# Patient Record
Sex: FEMALE | Race: WHITE | Employment: OTHER | URBAN - METROPOLITAN AREA
[De-identification: names, ages, dates, MRNs, and addresses within clinical notes are randomized per-mention and may not be internally consistent; named-entity substitution may affect disease eponyms.]

---

## 2017-01-18 RX ORDER — OMEPRAZOLE 20 MG/1
CAPSULE, DELAYED RELEASE ORAL
Qty: 30 CAPSULE | Refills: 2 | Status: SHIPPED | OUTPATIENT
Start: 2017-01-18 | End: 2017-04-18 | Stop reason: SDUPTHER

## 2017-01-18 RX ORDER — POTASSIUM CHLORIDE 750 MG/1
TABLET, FILM COATED, EXTENDED RELEASE ORAL
Qty: 180 TABLET | Refills: 0 | Status: SHIPPED | OUTPATIENT
Start: 2017-01-18 | End: 2017-04-18 | Stop reason: SDUPTHER

## 2017-02-21 RX ORDER — TRIAMTERENE AND HYDROCHLOROTHIAZIDE 37.5; 25 MG/1; MG/1
TABLET ORAL
Qty: 90 TABLET | Refills: 0 | Status: SHIPPED | OUTPATIENT
Start: 2017-02-21 | End: 2017-03-28 | Stop reason: SDUPTHER

## 2017-03-28 RX ORDER — SULINDAC 200 MG/1
TABLET ORAL
Qty: 180 TABLET | Refills: 0 | Status: SHIPPED | OUTPATIENT
Start: 2017-03-28 | End: 2017-06-21 | Stop reason: SDUPTHER

## 2017-03-28 RX ORDER — TRIAMTERENE AND HYDROCHLOROTHIAZIDE 37.5; 25 MG/1; MG/1
TABLET ORAL
Qty: 90 TABLET | Refills: 0 | Status: SHIPPED | OUTPATIENT
Start: 2017-03-28 | End: 2017-07-25 | Stop reason: SDUPTHER

## 2017-04-13 ENCOUNTER — HOSPITAL ENCOUNTER (OUTPATIENT)
Dept: OTHER | Age: 57
Discharge: HOME | End: 2017-04-13
Attending: INTERNAL MEDICINE | Admitting: INTERNAL MEDICINE

## 2017-04-13 ENCOUNTER — OFFICE VISIT (OUTPATIENT)
Dept: INTERNAL MEDICINE CLINIC | Age: 57
End: 2017-04-13

## 2017-04-13 VITALS
BODY MASS INDEX: 24.92 KG/M2 | SYSTOLIC BLOOD PRESSURE: 130 MMHG | DIASTOLIC BLOOD PRESSURE: 75 MMHG | WEIGHT: 146 LBS | HEIGHT: 64 IN | HEART RATE: 70 BPM | RESPIRATION RATE: 18 BRPM

## 2017-04-13 DIAGNOSIS — I10 BENIGN ESSENTIAL HTN: Primary | ICD-10-CM

## 2017-04-13 DIAGNOSIS — G35 MULTIPLE SCLEROSIS (HCC): ICD-10-CM

## 2017-04-13 DIAGNOSIS — G89.29 CHRONIC LEFT SHOULDER PAIN: ICD-10-CM

## 2017-04-13 DIAGNOSIS — M25.512 CHRONIC LEFT SHOULDER PAIN: ICD-10-CM

## 2017-04-13 DIAGNOSIS — G62.9 PERIPHERAL POLYNEUROPATHY: ICD-10-CM

## 2017-04-13 DIAGNOSIS — I10 BENIGN ESSENTIAL HTN: ICD-10-CM

## 2017-04-13 LAB
ANION GAP SERPL CALCULATED.3IONS-SCNC: 16 MMOL/L (ref 3–16)
BUN BLDV-MCNC: 19 MG/DL (ref 7–20)
CALCIUM SERPL-MCNC: 9.5 MG/DL (ref 8.3–10.6)
CHLORIDE BLD-SCNC: 103 MMOL/L (ref 99–110)
CO2: 23 MMOL/L (ref 21–32)
CREAT SERPL-MCNC: 0.7 MG/DL (ref 0.6–1.1)
GFR AFRICAN AMERICAN: >60
GFR NON-AFRICAN AMERICAN: >60
GLUCOSE BLD-MCNC: 107 MG/DL (ref 70–99)
POTASSIUM SERPL-SCNC: 4.1 MMOL/L (ref 3.5–5.1)
SODIUM BLD-SCNC: 142 MMOL/L (ref 136–145)

## 2017-04-13 PROCEDURE — 99214 OFFICE O/P EST MOD 30 MIN: CPT | Performed by: INTERNAL MEDICINE

## 2017-04-13 RX ORDER — CYCLOBENZAPRINE HCL 5 MG
5 TABLET ORAL 2 TIMES DAILY PRN
Qty: 20 TABLET | Refills: 0 | Status: SHIPPED | OUTPATIENT
Start: 2017-04-13 | End: 2017-04-21 | Stop reason: SDUPTHER

## 2017-04-13 ASSESSMENT — ENCOUNTER SYMPTOMS
ABDOMINAL PAIN: 1
NAUSEA: 0
CHEST TIGHTNESS: 0
PHOTOPHOBIA: 0
COLOR CHANGE: 0
SHORTNESS OF BREATH: 0
CONSTIPATION: 0
TROUBLE SWALLOWING: 0

## 2017-04-18 ENCOUNTER — TELEPHONE (OUTPATIENT)
Dept: INTERNAL MEDICINE CLINIC | Age: 57
End: 2017-04-18

## 2017-04-18 DIAGNOSIS — G89.29 CHRONIC LEFT SHOULDER PAIN: Primary | ICD-10-CM

## 2017-04-18 DIAGNOSIS — M25.512 CHRONIC LEFT SHOULDER PAIN: Primary | ICD-10-CM

## 2017-04-18 RX ORDER — POTASSIUM CHLORIDE 750 MG/1
TABLET, FILM COATED, EXTENDED RELEASE ORAL
Qty: 180 TABLET | Refills: 1 | Status: SHIPPED | OUTPATIENT
Start: 2017-04-18 | End: 2017-10-28 | Stop reason: SDUPTHER

## 2017-04-18 RX ORDER — OMEPRAZOLE 20 MG/1
CAPSULE, DELAYED RELEASE ORAL
Qty: 90 CAPSULE | Refills: 1 | Status: SHIPPED | OUTPATIENT
Start: 2017-04-18 | End: 2017-10-28 | Stop reason: SDUPTHER

## 2017-04-18 RX ORDER — MONTELUKAST SODIUM 10 MG/1
TABLET ORAL
Qty: 90 TABLET | Refills: 1 | Status: SHIPPED | OUTPATIENT
Start: 2017-04-18 | End: 2017-10-28 | Stop reason: SDUPTHER

## 2017-04-20 ENCOUNTER — HOSPITAL ENCOUNTER (OUTPATIENT)
Dept: OTHER | Age: 57
Discharge: HOME | End: 2017-04-20
Attending: PSYCHIATRY & NEUROLOGY | Admitting: PSYCHIATRY & NEUROLOGY

## 2017-04-20 LAB
CREAT SERPL-MCNC: 0.7 MG/DL (ref 0.6–1.1)
GFR AFRICAN AMERICAN: >60
GFR NON-AFRICAN AMERICAN: >60

## 2017-04-21 ENCOUNTER — TELEPHONE (OUTPATIENT)
Dept: INTERNAL MEDICINE CLINIC | Age: 57
End: 2017-04-21

## 2017-04-21 RX ORDER — CYCLOBENZAPRINE HCL 5 MG
5 TABLET ORAL 2 TIMES DAILY PRN
Qty: 20 TABLET | Refills: 0 | Status: SHIPPED | OUTPATIENT
Start: 2017-04-21 | End: 2017-04-28

## 2017-05-02 ENCOUNTER — TELEPHONE (OUTPATIENT)
Dept: INTERNAL MEDICINE CLINIC | Age: 57
End: 2017-05-02

## 2017-05-02 RX ORDER — CYCLOBENZAPRINE HCL 5 MG
5 TABLET ORAL 2 TIMES DAILY PRN
Qty: 60 TABLET | Refills: 0 | Status: SHIPPED | OUTPATIENT
Start: 2017-05-02 | End: 2017-06-22 | Stop reason: SDUPTHER

## 2017-05-03 ENCOUNTER — TELEPHONE (OUTPATIENT)
Dept: INTERNAL MEDICINE CLINIC | Age: 57
End: 2017-05-03

## 2017-05-22 RX ORDER — LOSARTAN POTASSIUM 50 MG/1
TABLET ORAL
Qty: 90 TABLET | Refills: 0 | Status: SHIPPED | OUTPATIENT
Start: 2017-05-22 | End: 2017-08-17 | Stop reason: SDUPTHER

## 2017-05-23 ENCOUNTER — OFFICE VISIT (OUTPATIENT)
Dept: ORTHOPEDIC SURGERY | Age: 57
End: 2017-05-23

## 2017-05-23 VITALS — WEIGHT: 145.94 LBS | HEIGHT: 64 IN | BODY MASS INDEX: 24.92 KG/M2

## 2017-05-23 DIAGNOSIS — M54.12 CERVICAL RADICULOPATHY: ICD-10-CM

## 2017-05-23 DIAGNOSIS — M50.30 DDD (DEGENERATIVE DISC DISEASE), CERVICAL: ICD-10-CM

## 2017-05-23 DIAGNOSIS — M25.512 LEFT SHOULDER PAIN, UNSPECIFIED CHRONICITY: Primary | ICD-10-CM

## 2017-05-23 DIAGNOSIS — M75.02 ADHESIVE CAPSULITIS OF LEFT SHOULDER: ICD-10-CM

## 2017-05-23 DIAGNOSIS — M75.42 SHOULDER IMPINGEMENT, LEFT: ICD-10-CM

## 2017-05-23 PROBLEM — M25.819 SHOULDER IMPINGEMENT: Status: ACTIVE | Noted: 2017-05-23

## 2017-05-23 PROBLEM — M75.40 SHOULDER IMPINGEMENT: Status: ACTIVE | Noted: 2017-05-23

## 2017-05-23 PROCEDURE — 72040 X-RAY EXAM NECK SPINE 2-3 VW: CPT | Performed by: PHYSICIAN ASSISTANT

## 2017-05-23 PROCEDURE — 99203 OFFICE O/P NEW LOW 30 MIN: CPT | Performed by: PHYSICIAN ASSISTANT

## 2017-05-23 PROCEDURE — 73030 X-RAY EXAM OF SHOULDER: CPT | Performed by: PHYSICIAN ASSISTANT

## 2017-05-23 RX ORDER — MELOXICAM 15 MG/1
15 TABLET ORAL DAILY
Qty: 30 TABLET | Refills: 3 | Status: SHIPPED | OUTPATIENT
Start: 2017-05-23 | End: 2017-09-21 | Stop reason: SDUPTHER

## 2017-05-26 RX ORDER — CYCLOBENZAPRINE HCL 5 MG
5 TABLET ORAL 2 TIMES DAILY PRN
Qty: 60 TABLET | Refills: 0 | OUTPATIENT
Start: 2017-05-26

## 2017-05-26 RX ORDER — CYCLOBENZAPRINE HCL 5 MG
TABLET ORAL
Qty: 20 TABLET | Refills: 0 | Status: SHIPPED | OUTPATIENT
Start: 2017-05-26 | End: 2017-06-22 | Stop reason: SDUPTHER

## 2017-05-30 ENCOUNTER — TELEPHONE (OUTPATIENT)
Dept: INTERNAL MEDICINE CLINIC | Age: 57
End: 2017-05-30

## 2017-05-30 RX ORDER — FUROSEMIDE 20 MG/1
20 TABLET ORAL DAILY
Qty: 3 TABLET | Refills: 0 | Status: SHIPPED | OUTPATIENT
Start: 2017-05-30 | End: 2017-11-07 | Stop reason: ALTCHOICE

## 2017-06-22 ENCOUNTER — OFFICE VISIT (OUTPATIENT)
Dept: ORTHOPEDIC SURGERY | Age: 57
End: 2017-06-22

## 2017-06-22 VITALS
DIASTOLIC BLOOD PRESSURE: 70 MMHG | HEART RATE: 74 BPM | WEIGHT: 145.94 LBS | BODY MASS INDEX: 24.92 KG/M2 | HEIGHT: 64 IN | SYSTOLIC BLOOD PRESSURE: 135 MMHG

## 2017-06-22 DIAGNOSIS — M75.42 SHOULDER IMPINGEMENT, LEFT: Primary | ICD-10-CM

## 2017-06-22 DIAGNOSIS — M75.02 ADHESIVE CAPSULITIS OF LEFT SHOULDER: ICD-10-CM

## 2017-06-22 PROCEDURE — 99213 OFFICE O/P EST LOW 20 MIN: CPT | Performed by: ORTHOPAEDIC SURGERY

## 2017-06-22 RX ORDER — CYCLOBENZAPRINE HCL 5 MG
5 TABLET ORAL 2 TIMES DAILY PRN
Qty: 20 TABLET | Refills: 0 | Status: SHIPPED | OUTPATIENT
Start: 2017-06-22 | End: 2017-08-17 | Stop reason: SDUPTHER

## 2017-06-22 RX ORDER — CYCLOBENZAPRINE HCL 5 MG
5 TABLET ORAL 2 TIMES DAILY PRN
Qty: 60 TABLET | Refills: 0 | Status: SHIPPED | OUTPATIENT
Start: 2017-06-22 | End: 2017-06-22 | Stop reason: SDUPTHER

## 2017-06-23 RX ORDER — SULINDAC 200 MG/1
TABLET ORAL
Qty: 180 TABLET | Refills: 0 | Status: SHIPPED | OUTPATIENT
Start: 2017-06-23 | End: 2017-09-22 | Stop reason: SDUPTHER

## 2017-07-26 RX ORDER — TRIAMTERENE AND HYDROCHLOROTHIAZIDE 37.5; 25 MG/1; MG/1
TABLET ORAL
Qty: 90 TABLET | Refills: 0 | Status: SHIPPED | OUTPATIENT
Start: 2017-07-26 | End: 2017-09-21 | Stop reason: SDUPTHER

## 2017-08-15 ENCOUNTER — OFFICE VISIT (OUTPATIENT)
Dept: INTERNAL MEDICINE CLINIC | Age: 57
End: 2017-08-15

## 2017-08-15 ENCOUNTER — TELEPHONE (OUTPATIENT)
Dept: INTERNAL MEDICINE CLINIC | Age: 57
End: 2017-08-15

## 2017-08-15 VITALS — SYSTOLIC BLOOD PRESSURE: 130 MMHG | HEART RATE: 70 BPM | RESPIRATION RATE: 18 BRPM | DIASTOLIC BLOOD PRESSURE: 75 MMHG

## 2017-08-15 DIAGNOSIS — M54.41 ACUTE MIDLINE LOW BACK PAIN WITH RIGHT-SIDED SCIATICA: Primary | ICD-10-CM

## 2017-08-15 DIAGNOSIS — M51.36 DDD (DEGENERATIVE DISC DISEASE), LUMBAR: ICD-10-CM

## 2017-08-15 DIAGNOSIS — M54.9 BACK PAIN, UNSPECIFIED BACK LOCATION, UNSPECIFIED BACK PAIN LATERALITY, UNSPECIFIED CHRONICITY: Primary | ICD-10-CM

## 2017-08-15 PROCEDURE — 99213 OFFICE O/P EST LOW 20 MIN: CPT | Performed by: INTERNAL MEDICINE

## 2017-08-15 RX ORDER — PREDNISONE 10 MG/1
TABLET ORAL
Qty: 10 TABLET | Refills: 0 | Status: SHIPPED | OUTPATIENT
Start: 2017-08-15 | End: 2017-11-07 | Stop reason: ALTCHOICE

## 2017-08-15 ASSESSMENT — ENCOUNTER SYMPTOMS
COLOR CHANGE: 0
BACK PAIN: 1
TROUBLE SWALLOWING: 0
NAUSEA: 0
CONSTIPATION: 0
CHEST TIGHTNESS: 0
PHOTOPHOBIA: 0
SHORTNESS OF BREATH: 0

## 2017-08-17 RX ORDER — CYCLOBENZAPRINE HCL 5 MG
TABLET ORAL
Qty: 20 TABLET | Refills: 0 | Status: SHIPPED | OUTPATIENT
Start: 2017-08-17 | End: 2017-09-21 | Stop reason: SDUPTHER

## 2017-08-17 RX ORDER — LOSARTAN POTASSIUM 50 MG/1
TABLET ORAL
Qty: 90 TABLET | Refills: 0 | Status: SHIPPED | OUTPATIENT
Start: 2017-08-17 | End: 2017-11-27 | Stop reason: SDUPTHER

## 2017-08-18 RX ORDER — FLUOCINONIDE 0.5 MG/G
OINTMENT TOPICAL
Qty: 60 G | Refills: 0 | Status: SHIPPED | OUTPATIENT
Start: 2017-08-18 | End: 2017-09-21 | Stop reason: SDUPTHER

## 2017-09-22 RX ORDER — CYCLOBENZAPRINE HCL 5 MG
TABLET ORAL
Qty: 20 TABLET | Refills: 0 | Status: SHIPPED | OUTPATIENT
Start: 2017-09-22 | End: 2017-10-28 | Stop reason: SDUPTHER

## 2017-09-22 RX ORDER — MELOXICAM 15 MG/1
TABLET ORAL
Qty: 30 TABLET | Refills: 3 | Status: SHIPPED | OUTPATIENT
Start: 2017-09-22 | End: 2017-11-07 | Stop reason: ALTCHOICE

## 2017-09-22 RX ORDER — SULINDAC 200 MG/1
TABLET ORAL
Qty: 180 TABLET | Refills: 0 | OUTPATIENT
Start: 2017-09-22

## 2017-09-22 RX ORDER — FLUOCINONIDE 0.5 MG/G
OINTMENT TOPICAL
Qty: 60 G | Refills: 0 | Status: SHIPPED | OUTPATIENT
Start: 2017-09-22 | End: 2017-11-06 | Stop reason: SDUPTHER

## 2017-09-22 RX ORDER — TRIAMTERENE AND HYDROCHLOROTHIAZIDE 37.5; 25 MG/1; MG/1
TABLET ORAL
Qty: 90 TABLET | Refills: 0 | Status: SHIPPED | OUTPATIENT
Start: 2017-09-22 | End: 2017-12-22 | Stop reason: SDUPTHER

## 2017-10-30 RX ORDER — POTASSIUM CHLORIDE 750 MG/1
TABLET, FILM COATED, EXTENDED RELEASE ORAL
Qty: 180 TABLET | Refills: 1 | Status: SHIPPED | OUTPATIENT
Start: 2017-10-30 | End: 2018-04-11 | Stop reason: SDUPTHER

## 2017-10-30 RX ORDER — OMEPRAZOLE 20 MG/1
CAPSULE, DELAYED RELEASE ORAL
Qty: 30 CAPSULE | Refills: 5 | Status: SHIPPED | OUTPATIENT
Start: 2017-10-30 | End: 2018-04-11 | Stop reason: SDUPTHER

## 2017-10-30 RX ORDER — MONTELUKAST SODIUM 10 MG/1
TABLET ORAL
Qty: 90 TABLET | Refills: 1 | Status: SHIPPED | OUTPATIENT
Start: 2017-10-30 | End: 2018-04-11 | Stop reason: SDUPTHER

## 2017-10-30 RX ORDER — CYCLOBENZAPRINE HCL 5 MG
TABLET ORAL
Qty: 20 TABLET | Refills: 0 | Status: SHIPPED | OUTPATIENT
Start: 2017-10-30 | End: 2017-11-27 | Stop reason: SDUPTHER

## 2017-11-06 RX ORDER — FLUOCINONIDE 0.5 MG/G
OINTMENT TOPICAL
Qty: 60 G | Refills: 0 | Status: SHIPPED | OUTPATIENT
Start: 2017-11-06 | End: 2017-11-27 | Stop reason: SDUPTHER

## 2017-11-07 ENCOUNTER — OFFICE VISIT (OUTPATIENT)
Dept: INTERNAL MEDICINE CLINIC | Age: 57
End: 2017-11-07

## 2017-11-07 VITALS
WEIGHT: 155 LBS | HEART RATE: 70 BPM | SYSTOLIC BLOOD PRESSURE: 150 MMHG | DIASTOLIC BLOOD PRESSURE: 75 MMHG | RESPIRATION RATE: 18 BRPM | BODY MASS INDEX: 26.46 KG/M2

## 2017-11-07 DIAGNOSIS — R60.0 PEDAL EDEMA: Primary | ICD-10-CM

## 2017-11-07 DIAGNOSIS — Z79.1 NSAID LONG-TERM USE: ICD-10-CM

## 2017-11-07 DIAGNOSIS — I10 BENIGN ESSENTIAL HTN: ICD-10-CM

## 2017-11-07 DIAGNOSIS — R60.0 PEDAL EDEMA: ICD-10-CM

## 2017-11-07 PROBLEM — N95.1 MENOPAUSAL STATE: Status: ACTIVE | Noted: 2017-10-12

## 2017-11-07 LAB
ANION GAP SERPL CALCULATED.3IONS-SCNC: 13 MMOL/L (ref 3–16)
BILIRUBIN, POC: NORMAL
BLOOD URINE, POC: NORMAL
BUN BLDV-MCNC: 15 MG/DL (ref 7–20)
CALCIUM SERPL-MCNC: 9.5 MG/DL (ref 8.3–10.6)
CHLORIDE BLD-SCNC: 106 MMOL/L (ref 99–110)
CLARITY, POC: NORMAL
CO2: 23 MMOL/L (ref 21–32)
COLOR, POC: NORMAL
CREAT SERPL-MCNC: 0.7 MG/DL (ref 0.6–1.1)
GFR AFRICAN AMERICAN: >60
GFR NON-AFRICAN AMERICAN: >60
GLUCOSE BLD-MCNC: 81 MG/DL (ref 70–99)
GLUCOSE URINE, POC: NORMAL
KETONES, POC: NORMAL
LEUKOCYTE EST, POC: NORMAL
NITRITE, POC: NORMAL
PH, POC: NORMAL
POTASSIUM SERPL-SCNC: 4.1 MMOL/L (ref 3.5–5.1)
PROTEIN PROTEIN: 9 MG/DL
PROTEIN, POC: NORMAL
SODIUM BLD-SCNC: 142 MMOL/L (ref 136–145)
SPECIFIC GRAVITY, POC: NORMAL
UROBILINOGEN, POC: NORMAL

## 2017-11-07 PROCEDURE — 99213 OFFICE O/P EST LOW 20 MIN: CPT | Performed by: INTERNAL MEDICINE

## 2017-11-07 PROCEDURE — 81002 URINALYSIS NONAUTO W/O SCOPE: CPT | Performed by: INTERNAL MEDICINE

## 2017-11-07 RX ORDER — FUROSEMIDE 20 MG/1
20 TABLET ORAL DAILY
Qty: 10 TABLET | Refills: 0 | Status: SHIPPED | OUTPATIENT
Start: 2017-11-07 | End: 2018-01-02 | Stop reason: SDUPTHER

## 2017-11-07 ASSESSMENT — ENCOUNTER SYMPTOMS
COLOR CHANGE: 0
TROUBLE SWALLOWING: 0
SHORTNESS OF BREATH: 0
NAUSEA: 0
CHEST TIGHTNESS: 0
CONSTIPATION: 0
PHOTOPHOBIA: 0

## 2017-11-07 NOTE — PROGRESS NOTES
Subjective:      Patient ID: Ish Daly is a 56 y.o. female. HPI      56 y.o. female with h.o HTN,, MS on copaxone,   varicose veins, chronic pedal edema here for regularf.w    Since last visit pt reports more increased bilateral leg swelling and no sob or cough  Reports taking sulindac and prn mobic, no changes in diet or activity  No excessive salt intake  Previously used prn lasix and now does not have one  Using maxzide with no benefit    No h.o renal disease, no change in urine color or nature        Current Outpatient Prescriptions   Medication Sig Dispense Refill    furosemide (LASIX) 20 MG tablet Take 1 tablet by mouth daily 10 tablet 0    fluocinonide (LIDEX) 0.05 % ointment APPLY  OINTMENT TOPICALLY TWICE DAILY 60 g 0    omeprazole (PRILOSEC) 20 MG delayed release capsule TAKE ONE CAPSULE BY MOUTH ONCE DAILY 30 capsule 5    cyclobenzaprine (FLEXERIL) 5 MG tablet TAKE ONE TABLET BY MOUTH TWICE DAILY AS NEEDED FOR  MUSCLE  SPASMS 20 tablet 0    montelukast (SINGULAIR) 10 MG tablet TAKE ONE TABLET BY MOUTH IN THE EVENING 90 tablet 1    potassium chloride (KLOR-CON) 10 MEQ extended release tablet TAKE ONE TABLET BY MOUTH TWICE DAILY 180 tablet 1    triamterene-hydrochlorothiazide (MAXZIDE-25) 37.5-25 MG per tablet TAKE ONE TABLET BY MOUTH ONCE DAILY 90 tablet 0    losartan (COZAAR) 50 MG tablet TAKE ONE TABLET BY MOUTH ONCE DAILY 90 tablet 0    gabapentin (NEURONTIN) 400 MG capsule Take 1 capsule by mouth 2 times daily 180 capsule 1    EPINEPHrine (EPIPEN 2-CONSTANZA) 0.3 MG/0.3ML SOAJ injection Use as directed for allergic reaction 1 each 2    Glatiramer Acetate (COPAXONE SC) Inject  into the skin. No current facility-administered medications for this visit. Review of Systems   Constitutional: Negative for activity change, diaphoresis and unexpected weight change. HENT: Negative for congestion, ear discharge, hearing loss and trouble swallowing.     Eyes: Negative for photophobia and visual disturbance. Respiratory: Negative for chest tightness and shortness of breath. Cardiovascular: Positive for leg swelling. Negative for chest pain and palpitations. Gastrointestinal: Negative for constipation and nausea. Endocrine: Negative for cold intolerance, heat intolerance and polyuria. Genitourinary: Negative for dysuria, flank pain and hematuria. Musculoskeletal: Positive for arthralgias. Negative for gait problem. Left shoulder pain    Skin: Negative for color change. Allergic/Immunologic: Negative for immunocompromised state. Neurological: Positive for weakness. Negative for tremors, seizures and numbness. Psychiatric/Behavioral: Negative for decreased concentration. The patient is not nervous/anxious. As above    There are no changes to past medical history, family history, social history or review of systems(except as noted in the history section) since prior note (all reviewed with patient). Objective:   Physical Exam  Vitals:    11/07/17 1439   BP: (!) 150/75   Pulse: 70   Resp: 18         General:  Awake, alert and oriented. Appears to be not in any distress  Mucous Membranes:  Pink , anicteric  Neck: No JVD, no carotid bruit, no thyromegaly  Chest:  Clear to auscultation bilaterally, no added sounds  Cardiovascular:  RRR S1S2 heard, no murmurs or gallops  Abdomen:  Soft, undistended, non tender, no organomegaly, BS present  Extremities: limited left shoulder movements with pain   Anterior rotator cuff pain noted   No edema or cyanosis. Distal pulses well felt  Neurological : grossly normal except for left LE weakness       Assessment:      1. Pedal edema  BASIC METABOLIC PANEL    POCT urinalysis dipstick    PROTEIN, URINE, RANDOM   2. Benign essential HTN  BASIC METABOLIC PANEL    PROTEIN, URINE, RANDOM   3.  NSAID long-term use             Plan:      Bilateral Pedal edema - most likely nsaid use  Already on sulindac and using mobic prn  No signs of CHF  No recent dietary changes  Check UA and urine protein    Add lasix 20 mg daily x 7 days    HTN - BP slightly high today most likely fluid and salt retention   - continue losartan     Mare Hanson was evaluated today and a DME order was entered for a standard wheelchair because she requires this to successfully complete daily living tasks of toileting, personal cares and ambulating. A standard manual wheelchair is necessary due to patient's impaired ambulation and mobility restrictions and would be unable to resolve these daily living tasks using a cane or walker. The patient is capable of using a standard wheelchair safely in their home and can maneuver within their home with adequate access. There is a caregiver available to provide necessary assistance. The need for this equipment was discussed with the patient and she understands, is in agreement, and has not expressed an unwillingness to use the wheelchair.

## 2017-11-27 RX ORDER — FLUOCINONIDE 0.5 MG/G
OINTMENT TOPICAL
Qty: 60 G | Refills: 3 | Status: SHIPPED | OUTPATIENT
Start: 2017-11-27 | End: 2018-05-07 | Stop reason: ALTCHOICE

## 2017-11-27 RX ORDER — CYCLOBENZAPRINE HCL 5 MG
TABLET ORAL
Qty: 20 TABLET | Refills: 3 | Status: SHIPPED | OUTPATIENT
Start: 2017-11-27 | End: 2018-05-07 | Stop reason: ALTCHOICE

## 2017-11-27 RX ORDER — LOSARTAN POTASSIUM 50 MG/1
TABLET ORAL
Qty: 90 TABLET | Refills: 0 | Status: SHIPPED | OUTPATIENT
Start: 2017-11-27 | End: 2018-02-21 | Stop reason: SDUPTHER

## 2017-12-26 RX ORDER — TRIAMTERENE AND HYDROCHLOROTHIAZIDE 37.5; 25 MG/1; MG/1
TABLET ORAL
Qty: 90 TABLET | Refills: 0 | Status: SHIPPED | OUTPATIENT
Start: 2017-12-26 | End: 2018-01-22 | Stop reason: SDUPTHER

## 2018-01-02 RX ORDER — FUROSEMIDE 20 MG/1
20 TABLET ORAL DAILY
Qty: 10 TABLET | Refills: 0 | Status: SHIPPED | OUTPATIENT
Start: 2018-01-02 | End: 2018-02-16 | Stop reason: SDUPTHER

## 2018-01-02 RX ORDER — SULINDAC 200 MG/1
TABLET ORAL
Qty: 180 TABLET | Refills: 0 | Status: SHIPPED | OUTPATIENT
Start: 2018-01-02 | End: 2018-02-12 | Stop reason: ALTCHOICE

## 2018-01-02 NOTE — TELEPHONE ENCOUNTER
----- Message from Bessie Sevilla MD sent at 1/2/2018  1:27 PM EST -----  Contact: XD:[phone]      ----- Message -----  From: Rita Mathew  Sent: 1/2/2018   1:14 PM  To: Bessie Sevilla MD    Needs her furosemide (LASIX) 20 MG tablet called in to [facility].     Last Visit: 11/07/17  Future Visit: 02/12/18    Verner Bride

## 2018-01-11 ENCOUNTER — OFFICE VISIT (OUTPATIENT)
Dept: ORTHOPEDIC SURGERY | Age: 58
End: 2018-01-11

## 2018-01-11 VITALS — WEIGHT: 142 LBS | BODY MASS INDEX: 25.16 KG/M2 | HEIGHT: 63 IN

## 2018-01-11 DIAGNOSIS — R52 PAIN: Primary | ICD-10-CM

## 2018-01-11 DIAGNOSIS — G35 MULTIPLE SCLEROSIS (HCC): ICD-10-CM

## 2018-01-11 DIAGNOSIS — S32.592A PUBIC RAMUS FRACTURE, LEFT, CLOSED, INITIAL ENCOUNTER (HCC): ICD-10-CM

## 2018-01-11 PROCEDURE — 99213 OFFICE O/P EST LOW 20 MIN: CPT | Performed by: ORTHOPAEDIC SURGERY

## 2018-01-11 NOTE — LETTER
[facility]  [address] [facility] [address]  Phone: [phone]  Fax: [phone]    Jyotsna Orourke MD        2018        PT:  Taylor Murillo   1960  DX:  Left superior/inferior pubic ramus fracture (S32.592A0; Multiple Sclerosis (G35)    Please fit patient for Duet Walker/Transport Chair.           Sincerely,    2018 11:59 AM    Jyotsna Orourke MD

## 2018-01-11 NOTE — PROGRESS NOTES
has good coordination. There is no weakness or sensory deficit. Body mass index is 25.15 kg/m². Right Hip Examination:    Inspection:  No erythema or signs of infection. There are no cutaneous lesions. Palpation: Tenderness to palpation over the right groin. Diffuse tenderness over the sacral area. Range of Motion: Pain-free range of motion right hip. Strength:  4/5 strength with hip flexion and extension    Special Tests: There is a negative log roll maneuver. Negative straight leg raise against resistance. Positive Michela's test.  Negative Homans test.    Skin: There are no rashes, ulcerations or lesions. Gait: Antalgic favoring the left side    Reflex 2+ patellar    Additional Comments:       Additional Examinations:         Contralateral Exam: Examination of the left hip reveals intact skin. The patient demonstrates full painless range of motion with regards to flexion, abduction, internal and external rotation. There is no tenderness about the greater trochanter. There is a negative straight leg raise against resistance. Strength is 5/5 throughout all planes. Lower Back: Examination of the lower back does not show any tenderness, deformity or injury. Range of motion is unremarkable. There is no gross instability. There are no rashes, ulcerations or lesions. Strength and tone are normal.    Radiology:     X-rays obtained and reviewed in office:  Views 2 views including AP pelvis and lateral  Location right hip  Impression : Well-maintained hip joint space. Evidence of healing right superior and inferior pubic rami fractures. Impression:  Encounter Diagnoses   Name Primary?  Pain Yes    Pubic ramus fracture, left, closed, initial encounter ([facility] [address]     Multiple sclerosis ([facility] [address]        Office Procedures:  Orders Placed This Encounter   Procedures    Hip 2-3 Vw W Pelvis Right       Treatment Plan:      Patient have had a long conversation.   Some of her more chronic long-lasting symptoms may be more related to her lumbar spine. Her acute increase in pain symptoms are probably related to her superior and inferior pubic rami fractures and sacral ala fractures. Have recommended protective weightbearing at this time with walker or wheelchair at all times. Have also recommended a follow-up in the office in approximately 4-6 weeks for repeat x-rays. Sooner problems arise.

## 2018-01-23 RX ORDER — MELOXICAM 15 MG/1
TABLET ORAL
Qty: 30 TABLET | Refills: 3 | Status: SHIPPED | OUTPATIENT
Start: 2018-01-23 | End: 2018-05-07 | Stop reason: ALTCHOICE

## 2018-01-23 RX ORDER — TRIAMTERENE AND HYDROCHLOROTHIAZIDE 37.5; 25 MG/1; MG/1
TABLET ORAL
Qty: 90 TABLET | Refills: 0 | Status: SHIPPED | OUTPATIENT
Start: 2018-01-23 | End: 2018-05-25 | Stop reason: SDUPTHER

## 2018-02-12 ENCOUNTER — OFFICE VISIT (OUTPATIENT)
Dept: INTERNAL MEDICINE CLINIC | Age: 58
End: 2018-02-12

## 2018-02-12 VITALS
HEIGHT: 64 IN | BODY MASS INDEX: 25.95 KG/M2 | RESPIRATION RATE: 18 BRPM | DIASTOLIC BLOOD PRESSURE: 75 MMHG | WEIGHT: 152 LBS | HEART RATE: 70 BPM | SYSTOLIC BLOOD PRESSURE: 130 MMHG

## 2018-02-12 DIAGNOSIS — R60.0 PEDAL EDEMA: ICD-10-CM

## 2018-02-12 DIAGNOSIS — I10 BENIGN ESSENTIAL HTN: Primary | ICD-10-CM

## 2018-02-12 DIAGNOSIS — I10 BENIGN ESSENTIAL HTN: ICD-10-CM

## 2018-02-12 DIAGNOSIS — K21.9 GASTROESOPHAGEAL REFLUX DISEASE WITHOUT ESOPHAGITIS: ICD-10-CM

## 2018-02-12 LAB
A/G RATIO: 1.7 (ref 1.1–2.2)
ALBUMIN SERPL-MCNC: 4.2 G/DL (ref 3.4–5)
ALP BLD-CCNC: 111 U/L (ref 40–129)
ALT SERPL-CCNC: 14 U/L (ref 10–40)
ANION GAP SERPL CALCULATED.3IONS-SCNC: 16 MMOL/L (ref 3–16)
AST SERPL-CCNC: 21 U/L (ref 15–37)
BASOPHILS ABSOLUTE: 0 K/UL (ref 0–0.2)
BASOPHILS RELATIVE PERCENT: 1 %
BILIRUB SERPL-MCNC: <0.2 MG/DL (ref 0–1)
BUN BLDV-MCNC: 17 MG/DL (ref 7–20)
CALCIUM SERPL-MCNC: 9.3 MG/DL (ref 8.3–10.6)
CHLORIDE BLD-SCNC: 106 MMOL/L (ref 99–110)
CO2: 23 MMOL/L (ref 21–32)
CREAT SERPL-MCNC: 0.8 MG/DL (ref 0.6–1.1)
EOSINOPHILS ABSOLUTE: 0.2 K/UL (ref 0–0.6)
EOSINOPHILS RELATIVE PERCENT: 3.4 %
GFR AFRICAN AMERICAN: >60
GFR NON-AFRICAN AMERICAN: >60
GLOBULIN: 2.5 G/DL
GLUCOSE BLD-MCNC: 122 MG/DL (ref 70–99)
HCT VFR BLD CALC: 29.6 % (ref 36–48)
HEMOGLOBIN: 9.2 G/DL (ref 12–16)
LYMPHOCYTES ABSOLUTE: 1.5 K/UL (ref 1–5.1)
LYMPHOCYTES RELATIVE PERCENT: 32.2 %
MCH RBC QN AUTO: 23.9 PG (ref 26–34)
MCHC RBC AUTO-ENTMCNC: 30.9 G/DL (ref 31–36)
MCV RBC AUTO: 77.3 FL (ref 80–100)
MONOCYTES ABSOLUTE: 0.2 K/UL (ref 0–1.3)
MONOCYTES RELATIVE PERCENT: 5.5 %
NEUTROPHILS ABSOLUTE: 2.6 K/UL (ref 1.7–7.7)
NEUTROPHILS RELATIVE PERCENT: 57.9 %
PDW BLD-RTO: 17.9 % (ref 12.4–15.4)
PLATELET # BLD: 338 K/UL (ref 135–450)
PMV BLD AUTO: 8.9 FL (ref 5–10.5)
POTASSIUM SERPL-SCNC: 3.9 MMOL/L (ref 3.5–5.1)
RBC # BLD: 3.84 M/UL (ref 4–5.2)
SODIUM BLD-SCNC: 145 MMOL/L (ref 136–145)
TOTAL PROTEIN: 6.7 G/DL (ref 6.4–8.2)
WBC # BLD: 4.5 K/UL (ref 4–11)

## 2018-02-12 PROCEDURE — 99213 OFFICE O/P EST LOW 20 MIN: CPT | Performed by: INTERNAL MEDICINE

## 2018-02-12 ASSESSMENT — ENCOUNTER SYMPTOMS
SHORTNESS OF BREATH: 0
NAUSEA: 0
CHEST TIGHTNESS: 0
PHOTOPHOBIA: 0
TROUBLE SWALLOWING: 0
COLOR CHANGE: 0
CONSTIPATION: 0

## 2018-02-12 ASSESSMENT — PATIENT HEALTH QUESTIONNAIRE - PHQ9
SUM OF ALL RESPONSES TO PHQ QUESTIONS 1-9: 1
1. LITTLE INTEREST OR PLEASURE IN DOING THINGS: 0
2. FEELING DOWN, DEPRESSED OR HOPELESS: 1
SUM OF ALL RESPONSES TO PHQ9 QUESTIONS 1 & 2: 1

## 2018-02-12 NOTE — PROGRESS NOTES
Visit Information    Have you changed or started any medications since your last visit including any over-the-counter medicines, vitamins, or herbal medicines? no   Are you having any side effects from any of your medications? -  no  Have you stopped taking any of your medications? Is so, why? -  no    Have you seen any other physician or provider since your last visit? No  Have you had any other diagnostic tests since your last visit? No  Have you been seen in the emergency room and/or had an admission to a hospital since we last saw you? No  Have you had your routine dental cleaning in the past 6 months? no    Have you activated your [employer] account? If not, what are your barriers?  Yes     Patient Care Team:  Yevette Hammans, MD as PCP - General    Medical History Review  Past Medical, Family, and Social History reviewed and does not contribute to the patient presenting condition    Health Maintenance   Topic Date Due    Hepatitis C screen  1960    HIV screen  07/20/1977    Cervical cancer screen  07/20/1983    DTaP/Tdap/Td vaccine (1 - Tdap) 03/14/1995    Flu vaccine (1) 09/01/2017    Breast cancer screen  07/20/2018    Potassium monitoring  11/07/2018    Creatinine monitoring  11/07/2018    Colon cancer screen colonoscopy  01/25/2020    Lipid screen  05/12/2021
with losartan and  maxzide . Edema - improved with prn lasix , Avoid nsaids     Chronic arthritis - on nsaids .     GERD - on ppi       MS - and with mild congnitive decline - maintained well on copaxone - f/w Dr. Carolann Velasquez ( neurology )   On zonegran and provigil as well     Neuropathy peripheral - on gabapentin    Depression - started on cymbalta by her neurology but did not tolerate well     High risk for falls - now using cane, advised quad cane      mammogram neg 7/16     need colonoscopy

## 2018-02-12 NOTE — PATIENT INSTRUCTIONS
Patient Self-Management Goal for Health Maintenance  Goal: I will schedule a yearly preventative care visit.   Barriers: none  Plan for overcoming my barriers: N/A  Confidence: 10/10  Anticipated Goal Completion Date: 5/12/18

## 2018-02-16 RX ORDER — FUROSEMIDE 20 MG/1
20 TABLET ORAL DAILY
Qty: 10 TABLET | Refills: 0 | Status: SHIPPED | OUTPATIENT
Start: 2018-02-16 | End: 2018-05-07 | Stop reason: ALTCHOICE

## 2018-02-22 ENCOUNTER — OFFICE VISIT (OUTPATIENT)
Dept: ORTHOPEDIC SURGERY | Age: 58
End: 2018-02-22

## 2018-02-22 VITALS
SYSTOLIC BLOOD PRESSURE: 143 MMHG | HEART RATE: 79 BPM | BODY MASS INDEX: 25.93 KG/M2 | WEIGHT: 151.9 LBS | HEIGHT: 64 IN | DIASTOLIC BLOOD PRESSURE: 79 MMHG

## 2018-02-22 DIAGNOSIS — S32.599D CLOSED FRACTURE OF PUBIC RAMUS, UNSPECIFIED LATERALITY, WITH ROUTINE HEALING, SUBSEQUENT ENCOUNTER: ICD-10-CM

## 2018-02-22 DIAGNOSIS — M25.551 RIGHT HIP PAIN: Primary | ICD-10-CM

## 2018-02-22 PROBLEM — S32.599A CLOSED FRACTURE OF PUBIC RAMUS (HCC): Status: ACTIVE | Noted: 2018-02-22

## 2018-02-22 PROCEDURE — 99213 OFFICE O/P EST LOW 20 MIN: CPT | Performed by: ORTHOPAEDIC SURGERY

## 2018-02-22 RX ORDER — LOSARTAN POTASSIUM 50 MG/1
TABLET ORAL
Qty: 90 TABLET | Refills: 0 | Status: SHIPPED | OUTPATIENT
Start: 2018-02-22 | End: 2018-03-26 | Stop reason: SDUPTHER

## 2018-02-23 DIAGNOSIS — D64.9 ANEMIA, UNSPECIFIED TYPE: Primary | ICD-10-CM

## 2018-02-23 DIAGNOSIS — D64.9 ANEMIA, UNSPECIFIED TYPE: ICD-10-CM

## 2018-02-23 LAB
BASOPHILS ABSOLUTE: 0.1 K/UL (ref 0–0.2)
BASOPHILS RELATIVE PERCENT: 1.3 %
EOSINOPHILS ABSOLUTE: 0.2 K/UL (ref 0–0.6)
EOSINOPHILS RELATIVE PERCENT: 4.4 %
HCT VFR BLD CALC: 28.4 % (ref 36–48)
HEMOGLOBIN: 9 G/DL (ref 12–16)
LYMPHOCYTES ABSOLUTE: 1.1 K/UL (ref 1–5.1)
LYMPHOCYTES RELATIVE PERCENT: 23 %
MCH RBC QN AUTO: 24.6 PG (ref 26–34)
MCHC RBC AUTO-ENTMCNC: 31.7 G/DL (ref 31–36)
MCV RBC AUTO: 77.6 FL (ref 80–100)
MONOCYTES ABSOLUTE: 0.4 K/UL (ref 0–1.3)
MONOCYTES RELATIVE PERCENT: 8 %
NEUTROPHILS ABSOLUTE: 3.1 K/UL (ref 1.7–7.7)
NEUTROPHILS RELATIVE PERCENT: 63.3 %
PDW BLD-RTO: 18.9 % (ref 12.4–15.4)
PLATELET # BLD: 291 K/UL (ref 135–450)
PMV BLD AUTO: 9.1 FL (ref 5–10.5)
RBC # BLD: 3.66 M/UL (ref 4–5.2)
WBC # BLD: 4.9 K/UL (ref 4–11)

## 2018-03-08 ENCOUNTER — OFFICE VISIT (OUTPATIENT)
Dept: INTERNAL MEDICINE CLINIC | Age: 58
End: 2018-03-08

## 2018-03-08 ENCOUNTER — HOSPITAL ENCOUNTER (OUTPATIENT)
Dept: OTHER | Age: 58
Discharge: HOME | End: 2018-03-08
Attending: NURSE PRACTITIONER | Admitting: NURSE PRACTITIONER

## 2018-03-08 VITALS
HEART RATE: 104 BPM | WEIGHT: 148 LBS | SYSTOLIC BLOOD PRESSURE: 144 MMHG | DIASTOLIC BLOOD PRESSURE: 80 MMHG | RESPIRATION RATE: 16 BRPM | TEMPERATURE: 97.7 F | BODY MASS INDEX: 25.27 KG/M2 | HEIGHT: 64 IN

## 2018-03-08 DIAGNOSIS — M25.552 PAIN OF LEFT HIP JOINT: ICD-10-CM

## 2018-03-08 DIAGNOSIS — M25.552 PAIN OF LEFT HIP JOINT: Primary | ICD-10-CM

## 2018-03-08 DIAGNOSIS — M54.50 ACUTE MIDLINE LOW BACK PAIN WITHOUT SCIATICA: ICD-10-CM

## 2018-03-08 DIAGNOSIS — D64.9 ANEMIA, UNSPECIFIED TYPE: ICD-10-CM

## 2018-03-08 LAB
HCT VFR BLD CALC: 34.9 % (ref 36–48)
HEMOGLOBIN: 11.1 G/DL (ref 12–16)
MCH RBC QN AUTO: 25.6 PG (ref 26–34)
MCHC RBC AUTO-ENTMCNC: 31.9 G/DL (ref 31–36)
MCV RBC AUTO: 80 FL (ref 80–100)
PDW BLD-RTO: 23.9 % (ref 12.4–15.4)
PLATELET # BLD: 278 K/UL (ref 135–450)
PMV BLD AUTO: 9.4 FL (ref 5–10.5)
RBC # BLD: 4.36 M/UL (ref 4–5.2)
WBC # BLD: 6.3 K/UL (ref 4–11)

## 2018-03-08 PROCEDURE — 99213 OFFICE O/P EST LOW 20 MIN: CPT | Performed by: NURSE PRACTITIONER

## 2018-03-08 ASSESSMENT — ENCOUNTER SYMPTOMS
DIARRHEA: 0
NAUSEA: 0
RHINORRHEA: 1
VOMITING: 0
SHORTNESS OF BREATH: 1
ABDOMINAL PAIN: 0
COUGH: 1

## 2018-03-09 ENCOUNTER — TELEPHONE (OUTPATIENT)
Dept: INTERNAL MEDICINE CLINIC | Age: 58
End: 2018-03-09

## 2018-03-09 NOTE — TELEPHONE ENCOUNTER
----- Message from Maudine Dakins, CNP sent at 3/9/2018 11:21 AM EST -----  Still anemic, but are better. She needs to continue taking iron. ----- Message -----  From: Regi Verdugo  Sent: 3/9/2018  10:59 AM  To: Maudine Dakins, CNP    Pt wants to know since her blood counts are improved, does this mean she is no longer anemic?

## 2018-03-19 ENCOUNTER — TELEPHONE (OUTPATIENT)
Dept: INTERNAL MEDICINE CLINIC | Age: 58
End: 2018-03-19

## 2018-03-19 DIAGNOSIS — G43.909 MIGRAINE WITHOUT STATUS MIGRAINOSUS, NOT INTRACTABLE, UNSPECIFIED MIGRAINE TYPE: Primary | ICD-10-CM

## 2018-03-19 NOTE — TELEPHONE ENCOUNTER
----- Message from Lacho Wyatt sent at 3/19/2018  4:17 PM EDT -----  Contact: Pt. [phone]  Pt. Called stating she needed a referral sent to ,  For migraines - [phone]. [employer].     Future visit:05/07/2018  Last visit:03/08/2018    km

## 2018-03-26 RX ORDER — LOSARTAN POTASSIUM 50 MG/1
TABLET ORAL
Qty: 90 TABLET | Refills: 0 | Status: SHIPPED | OUTPATIENT
Start: 2018-03-26 | End: 2018-08-20 | Stop reason: SDUPTHER

## 2018-04-03 ENCOUNTER — HOSPITAL ENCOUNTER (OUTPATIENT)
Dept: OTHER | Age: 58
Discharge: HOME | End: 2018-04-03
Attending: PSYCHIATRY & NEUROLOGY | Admitting: PSYCHIATRY & NEUROLOGY

## 2018-04-03 LAB — VITAMIN D 25-HYDROXY: 84.8 NG/ML

## 2018-04-12 RX ORDER — OMEPRAZOLE 20 MG/1
CAPSULE, DELAYED RELEASE ORAL
Qty: 30 CAPSULE | Refills: 0 | Status: SHIPPED | OUTPATIENT
Start: 2018-04-12 | End: 2018-05-25 | Stop reason: SDUPTHER

## 2018-04-12 RX ORDER — MONTELUKAST SODIUM 10 MG/1
TABLET ORAL
Qty: 30 TABLET | Refills: 0 | Status: SHIPPED | OUTPATIENT
Start: 2018-04-12 | End: 2018-05-25 | Stop reason: SDUPTHER

## 2018-04-12 RX ORDER — POTASSIUM CHLORIDE 750 MG/1
TABLET, FILM COATED, EXTENDED RELEASE ORAL
Qty: 60 TABLET | Refills: 0 | Status: SHIPPED | OUTPATIENT
Start: 2018-04-12 | End: 2018-05-25 | Stop reason: SDUPTHER

## 2018-04-18 ENCOUNTER — HOSPITAL ENCOUNTER (OUTPATIENT)
Dept: OTHER | Age: 58
Discharge: HOME | End: 2018-04-18
Attending: NURSE PRACTITIONER | Admitting: NURSE PRACTITIONER

## 2018-04-19 LAB — TSH REFLEX: 1.06 UIU/ML (ref 0.27–4.2)

## 2018-04-27 ENCOUNTER — TELEPHONE (OUTPATIENT)
Dept: INTERNAL MEDICINE CLINIC | Age: 58
End: 2018-04-27

## 2018-04-27 RX ORDER — AZITHROMYCIN 250 MG/1
TABLET, FILM COATED ORAL
Qty: 1 PACKET | Refills: 0 | Status: SHIPPED | OUTPATIENT
Start: 2018-04-27 | End: 2018-05-07 | Stop reason: ALTCHOICE

## 2018-05-07 ENCOUNTER — OFFICE VISIT (OUTPATIENT)
Dept: INTERNAL MEDICINE CLINIC | Age: 58
End: 2018-05-07

## 2018-05-07 VITALS
HEART RATE: 70 BPM | DIASTOLIC BLOOD PRESSURE: 75 MMHG | SYSTOLIC BLOOD PRESSURE: 125 MMHG | RESPIRATION RATE: 18 BRPM | BODY MASS INDEX: 25.27 KG/M2 | HEIGHT: 64 IN | WEIGHT: 148 LBS

## 2018-05-07 DIAGNOSIS — G62.9 PERIPHERAL POLYNEUROPATHY: ICD-10-CM

## 2018-05-07 DIAGNOSIS — Z78.0 POST-MENOPAUSAL: ICD-10-CM

## 2018-05-07 DIAGNOSIS — D50.8 OTHER IRON DEFICIENCY ANEMIA: ICD-10-CM

## 2018-05-07 DIAGNOSIS — I10 BENIGN ESSENTIAL HTN: Primary | ICD-10-CM

## 2018-05-07 PROCEDURE — 99213 OFFICE O/P EST LOW 20 MIN: CPT | Performed by: INTERNAL MEDICINE

## 2018-05-07 ASSESSMENT — ENCOUNTER SYMPTOMS
PHOTOPHOBIA: 0
COLOR CHANGE: 0
CHEST TIGHTNESS: 0
TROUBLE SWALLOWING: 0
NAUSEA: 0
CONSTIPATION: 0
SHORTNESS OF BREATH: 0

## 2018-05-10 ASSESSMENT — ENCOUNTER SYMPTOMS: BACK PAIN: 1

## 2018-05-16 ENCOUNTER — TELEPHONE (OUTPATIENT)
Dept: INTERNAL MEDICINE CLINIC | Age: 58
End: 2018-05-16

## 2018-05-16 DIAGNOSIS — D64.9 ANEMIA, UNSPECIFIED TYPE: Primary | ICD-10-CM

## 2018-05-25 RX ORDER — POTASSIUM CHLORIDE 750 MG/1
TABLET, FILM COATED, EXTENDED RELEASE ORAL
Qty: 180 TABLET | Refills: 0 | Status: SHIPPED | OUTPATIENT
Start: 2018-05-25 | End: 2018-08-20 | Stop reason: SDUPTHER

## 2018-05-25 RX ORDER — TRIAMTERENE AND HYDROCHLOROTHIAZIDE 37.5; 25 MG/1; MG/1
TABLET ORAL
Qty: 90 TABLET | Refills: 0 | Status: SHIPPED | OUTPATIENT
Start: 2018-05-25 | End: 2018-08-20 | Stop reason: SDUPTHER

## 2018-05-25 RX ORDER — OMEPRAZOLE 20 MG/1
CAPSULE, DELAYED RELEASE ORAL
Qty: 90 CAPSULE | Refills: 0 | Status: SHIPPED | OUTPATIENT
Start: 2018-05-25 | End: 2018-08-20 | Stop reason: SDUPTHER

## 2018-05-25 RX ORDER — MONTELUKAST SODIUM 10 MG/1
TABLET ORAL
Qty: 90 TABLET | Refills: 0 | Status: SHIPPED | OUTPATIENT
Start: 2018-05-25 | End: 2018-08-20 | Stop reason: SDUPTHER

## 2018-06-01 ENCOUNTER — OFFICE VISIT (OUTPATIENT)
Dept: INTERNAL MEDICINE CLINIC | Age: 58
End: 2018-06-01

## 2018-06-01 VITALS
WEIGHT: 142 LBS | TEMPERATURE: 97.9 F | DIASTOLIC BLOOD PRESSURE: 80 MMHG | HEIGHT: 64 IN | HEART RATE: 76 BPM | BODY MASS INDEX: 24.24 KG/M2 | SYSTOLIC BLOOD PRESSURE: 138 MMHG

## 2018-06-01 DIAGNOSIS — R21 SKIN RASH: ICD-10-CM

## 2018-06-01 DIAGNOSIS — J02.9 PHARYNGITIS, UNSPECIFIED ETIOLOGY: Primary | ICD-10-CM

## 2018-06-01 LAB
BASOPHILS ABSOLUTE: 0 K/UL (ref 0–0.2)
BASOPHILS RELATIVE PERCENT: 0.7 %
EOSINOPHILS ABSOLUTE: 0.3 K/UL (ref 0–0.6)
EOSINOPHILS RELATIVE PERCENT: 6 %
HCT VFR BLD CALC: 40.7 % (ref 36–48)
HEMOGLOBIN: 13.5 G/DL (ref 12–16)
LYMPHOCYTES ABSOLUTE: 1.3 K/UL (ref 1–5.1)
LYMPHOCYTES RELATIVE PERCENT: 25.3 %
MCH RBC QN AUTO: 29.3 PG (ref 26–34)
MCHC RBC AUTO-ENTMCNC: 33.2 G/DL (ref 31–36)
MCV RBC AUTO: 88.4 FL (ref 80–100)
MONOCYTES ABSOLUTE: 0.4 K/UL (ref 0–1.3)
MONOCYTES RELATIVE PERCENT: 8.5 %
NEUTROPHILS ABSOLUTE: 3 K/UL (ref 1.7–7.7)
NEUTROPHILS RELATIVE PERCENT: 59.5 %
PDW BLD-RTO: 14.2 % (ref 12.4–15.4)
PLATELET # BLD: 227 K/UL (ref 135–450)
PMV BLD AUTO: 9.5 FL (ref 5–10.5)
RBC # BLD: 4.61 M/UL (ref 4–5.2)
WBC # BLD: 5.1 K/UL (ref 4–11)

## 2018-06-01 PROCEDURE — 99213 OFFICE O/P EST LOW 20 MIN: CPT | Performed by: INTERNAL MEDICINE

## 2018-06-01 ASSESSMENT — ENCOUNTER SYMPTOMS
SORE THROAT: 1
DIARRHEA: 0
NAUSEA: 0
COUGH: 0
VOMITING: 0

## 2018-06-02 LAB — SEDIMENTATION RATE, ERYTHROCYTE: 20 MM/HR (ref 0–30)

## 2018-07-16 ENCOUNTER — TELEPHONE (OUTPATIENT)
Dept: INTERNAL MEDICINE CLINIC | Age: 58
End: 2018-07-16

## 2018-07-16 ENCOUNTER — OFFICE VISIT (OUTPATIENT)
Dept: ORTHOPEDIC SURGERY | Age: 58
End: 2018-07-16

## 2018-07-16 DIAGNOSIS — R52 PAIN: Primary | ICD-10-CM

## 2018-07-16 DIAGNOSIS — S32.599D CLOSED FRACTURE OF PUBIC RAMUS, UNSPECIFIED LATERALITY, WITH ROUTINE HEALING, SUBSEQUENT ENCOUNTER: ICD-10-CM

## 2018-07-16 PROCEDURE — 99213 OFFICE O/P EST LOW 20 MIN: CPT | Performed by: ORTHOPAEDIC SURGERY

## 2018-07-16 RX ORDER — FUROSEMIDE 20 MG/1
20 TABLET ORAL DAILY PRN
Qty: 10 TABLET | Refills: 0 | Status: SHIPPED | OUTPATIENT
Start: 2018-07-16 | End: 2019-01-07 | Stop reason: ALTCHOICE

## 2018-07-16 RX ORDER — DIAZEPAM 5 MG/1
TABLET ORAL
Qty: 1 TABLET | Refills: 0 | Status: SHIPPED | OUTPATIENT
Start: 2018-07-16 | End: 2018-07-30

## 2018-07-16 NOTE — PROGRESS NOTES
Chief Complaint    Hip Pain (6 MONTHS status post closed management of right superior inferior pubic ramus fracture; still having groin pain and leg pain)      History of Present Illness:  Kristine Weeks is a 56 y.o. female presents the office today for follow up chief complaint right hip pain. Patient has right hip pain off and on for many months. Pain concentrated over her groin and buttocks and lower lumbar spine. Symptoms aggravated by activities and improved by rest.  Patient does suffer from [address] which has left her with both upper and lower extremity weakness. She has been seen for this problem in the past.  She has difficulty with daily activities she really notes pain from her lumbar spine following down to her legs. There is no real focal point of pain. She is referred by Dr. Puma Daniel. Pain Assessment  Location of Pain: Pelvis  Severity of Pain: 8  Frequency of Pain: Intermittent  Aggravating Factors: Walking, Standing  Limiting Behavior: Yes  Work-Related Injury: No  Are there other pain locations you wish to document?: No]       Medical History:  Patient's medications, allergies, past medical, surgical, social and family histories were reviewed and updated as appropriate. Review of Systems:  Relevant review of systems reviewed and available in the patient's chart    Vital Signs: There were no vitals filed for this visit. General Exam:   Constitutional: Patient is adequately groomed with no evidence of malnutrition  DTRs: Deep tendon reflexes are intact  Mental Status: The patient is oriented to time, place and person. The patient's mood and affect are appropriate. Lymphatic: The lymphatic examination bilaterally reveals all areas to be without enlargement or induration. Vascular: Examination reveals no swelling or calf tenderness. Peripheral pulses are palpable and 2+. Neurological: The patient has good coordination. There is no weakness or sensory deficit.     There is no height or

## 2018-08-06 ENCOUNTER — OFFICE VISIT (OUTPATIENT)
Dept: ORTHOPEDIC SURGERY | Age: 58
End: 2018-08-06

## 2018-08-06 DIAGNOSIS — S32.592A PUBIC RAMUS FRACTURE, LEFT, CLOSED, INITIAL ENCOUNTER (HCC): ICD-10-CM

## 2018-08-06 DIAGNOSIS — M86.9 OSTEITIS PUBIS (HCC): Primary | ICD-10-CM

## 2018-08-06 PROCEDURE — 99213 OFFICE O/P EST LOW 20 MIN: CPT | Performed by: ORTHOPAEDIC SURGERY

## 2018-08-06 NOTE — PROGRESS NOTES
lymphatic examination bilaterally reveals all areas to be without enlargement or induration. Vascular: Examination reveals no swelling or calf tenderness. Peripheral pulses are palpable and 2+. Neurological: The patient has good coordination. There is no weakness or sensory deficit. There is no height or weight on file to calculate BMI. Right Hip Examination:    Inspection:  No erythema or signs of infection. There are no cutaneous lesions. Palpation: Tenderness to palpation over the right groin. Diffuse tenderness over the sacral area. Range of Motion: Pain-free range of motion right hip. Strength:  4/5 strength with hip flexion and extension    Special Tests: There is a negative mildly positive roll maneuver. Negative straight leg raise against resistance. Positive Michela's test.  Negative Homans test.    Skin: There are no rashes, ulcerations or lesions. Gait: Antalgic favoring the left side    Reflex 2+ patellar     Left Hip Examination:    Inspection:  No erythema or signs of infection. There are no cutaneous lesions. Palpation: Tenderness to palpation over the right groin. Diffuse tenderness over the sacral area. Range of Motion: Pain-free range of motion right hip. Strength:  4/5 strength with hip flexion and extension    Special Tests: There is a negative mildly positive roll maneuver. Negative straight leg raise against resistance. Positive Michela's test.  Negative Homans test.    Skin: There are no rashes, ulcerations or lesions. Gait: Antalgic favoring the left side    Reflex 2+ patellar          Radiology:     FINDINGS:  A nonunited fracture of right inferior ischiopubic ramus and anterior column of    right acetabulum.  No AVN.   Femoroacetabular alignment within normal limits.  Mild spurring of    the acetabula.  No acute labral tear.  No paralabral cyst.  No substantive joint effusion.       No trochanteric bursal cysts.  No acute muscle or tendinous

## 2018-08-20 RX ORDER — TRIAMTERENE AND HYDROCHLOROTHIAZIDE 37.5; 25 MG/1; MG/1
TABLET ORAL
Qty: 90 TABLET | Refills: 0 | Status: SHIPPED | OUTPATIENT
Start: 2018-08-20 | End: 2018-09-21 | Stop reason: SDUPTHER

## 2018-08-20 RX ORDER — MONTELUKAST SODIUM 10 MG/1
TABLET ORAL
Qty: 90 TABLET | Refills: 0 | Status: SHIPPED | OUTPATIENT
Start: 2018-08-20 | End: 2018-10-01 | Stop reason: SDUPTHER

## 2018-08-20 RX ORDER — LOSARTAN POTASSIUM 50 MG/1
TABLET ORAL
Qty: 90 TABLET | Refills: 0 | Status: SHIPPED | OUTPATIENT
Start: 2018-08-20 | End: 2018-10-01 | Stop reason: SDUPTHER

## 2018-08-20 RX ORDER — OMEPRAZOLE 20 MG/1
CAPSULE, DELAYED RELEASE ORAL
Qty: 90 CAPSULE | Refills: 0 | Status: SHIPPED | OUTPATIENT
Start: 2018-08-20 | End: 2018-09-21 | Stop reason: SDUPTHER

## 2018-08-20 RX ORDER — POTASSIUM CHLORIDE 750 MG/1
TABLET, FILM COATED, EXTENDED RELEASE ORAL
Qty: 180 TABLET | Refills: 0 | Status: SHIPPED | OUTPATIENT
Start: 2018-08-20 | End: 2018-10-01 | Stop reason: SDUPTHER

## 2018-09-07 ENCOUNTER — TELEPHONE (OUTPATIENT)
Dept: ORTHOPEDIC SURGERY | Age: 58
End: 2018-09-07

## 2018-09-10 ENCOUNTER — OFFICE VISIT (OUTPATIENT)
Dept: ORTHOPEDIC SURGERY | Age: 58
End: 2018-09-10

## 2018-09-10 VITALS
WEIGHT: 137 LBS | BODY MASS INDEX: 23.39 KG/M2 | HEIGHT: 64 IN | SYSTOLIC BLOOD PRESSURE: 118 MMHG | HEART RATE: 61 BPM | DIASTOLIC BLOOD PRESSURE: 56 MMHG

## 2018-09-10 DIAGNOSIS — R52 PAIN: ICD-10-CM

## 2018-09-10 DIAGNOSIS — M25.551 PAIN OF BOTH HIP JOINTS: ICD-10-CM

## 2018-09-10 DIAGNOSIS — M41.9 SCOLIOSIS OF LUMBOSACRAL SPINE, UNSPECIFIED SCOLIOSIS TYPE: ICD-10-CM

## 2018-09-10 DIAGNOSIS — S32.599D CLOSED FRACTURE OF PUBIC RAMUS, UNSPECIFIED LATERALITY, WITH ROUTINE HEALING, SUBSEQUENT ENCOUNTER: Primary | ICD-10-CM

## 2018-09-10 DIAGNOSIS — M25.552 PAIN OF BOTH HIP JOINTS: ICD-10-CM

## 2018-09-10 DIAGNOSIS — G35 MULTIPLE SCLEROSIS (HCC): ICD-10-CM

## 2018-09-10 PROCEDURE — L0625 LO FLEX L1-BELOW L5 PRE OTS: HCPCS | Performed by: ORTHOPAEDIC SURGERY

## 2018-09-10 PROCEDURE — 99213 OFFICE O/P EST LOW 20 MIN: CPT | Performed by: ORTHOPAEDIC SURGERY

## 2018-09-10 NOTE — PROGRESS NOTES
Chief Complaint    Follow-up (BILATERAL lateral Hip pain is about the same; ambulates with cane)      History of Present Illness:  Geremias Alcantara is a 66 y.o. female returns today for follow-up of her pelvic fracture. She has severe lumbosacral pain and greater trochanteric bursitis on both hips but her groin pain is significantly better. She has had no recent falls. She does suffer from multiple sclerosis and has significant neuromuscular scoliosis and loss of trunk control. She follows up with a neurosurgeon from [place] for this. Pain Assessment  Location of Pain: Pelvis (hip)  Location Modifiers: Left, Right, Lateral  Severity of Pain: 10  Quality of Pain: Sharp  Duration of Pain: Persistent  Frequency of Pain: Intermittent  Aggravating Factors: Standing, Walking, Bending (sitting in car)  Limiting Behavior: Some  Relieving Factors: Rest    Medical History:  Patient's medications, allergies, past medical, surgical, social and family histories were reviewed and updated as appropriate. Review of Systems:  Pertinent items are noted in HPI  Review of systems reviewed from Patient History Form dated on 1/11/2018 and available in the patient's chart under the Media tab. Vital Signs:  BP (!) 118/56   Pulse 61   Ht 5' 4\" (1.626 m)   Wt 137 lb (62.1 kg)   BMI 23.52 kg/m²     General Exam:   Constitutional: Patient is adequately groomed with no evidence of malnutrition  Mental Status: The patient is oriented to time, place and person. The patient's mood and affect are appropriate. Lymphatic: The lymphatic examination bilaterally reveals all areas to be without enlargement or induration. Vascular: Examination reveals no swelling or calf tenderness. Peripheral pulses are palpable and 2+. Neurological: The patient has good coordination. There is no weakness or sensory deficit.     Hip Examination:    Inspection:  No skin abnormalities noted, no evidence of lymphangitis or lymphedema, ecchymosis or bruising. Palpation:  Pain to palpation in right and left greater trochanteric bursal area, there is specifically no pain in the groin today. She also has significant pain in the SI joint and lumbosacral junction. Range of Motion:  Full range of motion which is pain-free in bilateral hips    Strength:  4/5 hip strength    Special Tests:  Positive Michela test, negative Lasegue's test, negative straight leg raise, negative logroll maneuver    Skin: There are no rashes, ulcerations or lesions. Gait: Antalgic gait pattern        Additional Comments:       Additional Examinations:         Left Lower Extremity: Examination of the left lower extremity does not show any tenderness, deformity or injury. Range of motion is unremarkable. There is no gross instability. There are no rashes, ulcerations or lesions. Strength and tone are normal.    Radiology:     X-rays obtained and reviewed in office:  Views 1  Location AP pelvis  Impression healing right pubic ramus fracture with no evidence of intra-articular hip involvement. There is significant lumbosacral scoliosis and degenerative spondylosis         Assessment :  Healed pubic ramus fracture right; significant lumbosacral neuromuscular scoliosis    Impression:  Encounter Diagnoses   Name Primary?     Pain     Closed fracture of pubic ramus, unspecified laterality, with routine healing, subsequent encounter Yes    Multiple sclerosis (HCC)     Pain of both hip joints     Scoliosis of lumbosacral spine, unspecified scoliosis type        Office Procedures:  Orders Placed This Encounter   Procedures    XR PELVIS (1-2 VIEWS)    PT aquatic therapy     Scheduling Instructions:      [facility]      [address]       [phone]            EVAL AND TREAT:  RIGHT and LEFT lateral hip pain; healing pubic ramus fx            Modalities of Choice            2-3 times/week for 4-5 weeks    Warm N Form B&C     Patient

## 2018-09-21 RX ORDER — OMEPRAZOLE 20 MG/1
CAPSULE, DELAYED RELEASE ORAL
Qty: 90 CAPSULE | Refills: 0 | Status: SHIPPED | OUTPATIENT
Start: 2018-09-21 | End: 2019-02-19 | Stop reason: SDUPTHER

## 2018-09-21 RX ORDER — TRIAMTERENE AND HYDROCHLOROTHIAZIDE 37.5; 25 MG/1; MG/1
TABLET ORAL
Qty: 90 TABLET | Refills: 0 | Status: SHIPPED | OUTPATIENT
Start: 2018-09-21 | End: 2019-01-10 | Stop reason: SDUPTHER

## 2018-10-01 ENCOUNTER — OFFICE VISIT (OUTPATIENT)
Dept: INTERNAL MEDICINE CLINIC | Age: 58
End: 2018-10-01

## 2018-10-01 VITALS
HEART RATE: 70 BPM | RESPIRATION RATE: 18 BRPM | DIASTOLIC BLOOD PRESSURE: 75 MMHG | HEIGHT: 64 IN | WEIGHT: 136 LBS | BODY MASS INDEX: 23.22 KG/M2 | SYSTOLIC BLOOD PRESSURE: 130 MMHG

## 2018-10-01 DIAGNOSIS — F32.9 REACTIVE DEPRESSION: ICD-10-CM

## 2018-10-01 DIAGNOSIS — I10 BENIGN ESSENTIAL HTN: ICD-10-CM

## 2018-10-01 DIAGNOSIS — N95.1 POST MENOPAUSAL SYNDROME: ICD-10-CM

## 2018-10-01 DIAGNOSIS — D50.9 IRON DEFICIENCY ANEMIA, UNSPECIFIED IRON DEFICIENCY ANEMIA TYPE: ICD-10-CM

## 2018-10-01 DIAGNOSIS — Z11.59 NEED FOR HEPATITIS C SCREENING TEST: ICD-10-CM

## 2018-10-01 DIAGNOSIS — I10 BENIGN ESSENTIAL HTN: Primary | ICD-10-CM

## 2018-10-01 DIAGNOSIS — G35 MULTIPLE SCLEROSIS (HCC): ICD-10-CM

## 2018-10-01 DIAGNOSIS — J30.2 SEASONAL ALLERGIES: ICD-10-CM

## 2018-10-01 LAB
BASOPHILS ABSOLUTE: 0 K/UL (ref 0–0.2)
BASOPHILS RELATIVE PERCENT: 0.6 %
EOSINOPHILS ABSOLUTE: 0.1 K/UL (ref 0–0.6)
EOSINOPHILS RELATIVE PERCENT: 2.1 %
HCT VFR BLD CALC: 38.9 % (ref 36–48)
HEMOGLOBIN: 12.6 G/DL (ref 12–16)
LYMPHOCYTES ABSOLUTE: 1.1 K/UL (ref 1–5.1)
LYMPHOCYTES RELATIVE PERCENT: 27.2 %
MCH RBC QN AUTO: 30.1 PG (ref 26–34)
MCHC RBC AUTO-ENTMCNC: 32.5 G/DL (ref 31–36)
MCV RBC AUTO: 92.5 FL (ref 80–100)
MONOCYTES ABSOLUTE: 0.3 K/UL (ref 0–1.3)
MONOCYTES RELATIVE PERCENT: 7.3 %
NEUTROPHILS ABSOLUTE: 2.6 K/UL (ref 1.7–7.7)
NEUTROPHILS RELATIVE PERCENT: 62.8 %
PDW BLD-RTO: 12.4 % (ref 12.4–15.4)
PLATELET # BLD: 268 K/UL (ref 135–450)
PMV BLD AUTO: 9.3 FL (ref 5–10.5)
RBC # BLD: 4.21 M/UL (ref 4–5.2)
WBC # BLD: 4.1 K/UL (ref 4–11)

## 2018-10-01 PROCEDURE — 99213 OFFICE O/P EST LOW 20 MIN: CPT | Performed by: INTERNAL MEDICINE

## 2018-10-01 RX ORDER — MONTELUKAST SODIUM 10 MG/1
TABLET ORAL
Qty: 90 TABLET | Refills: 0 | Status: SHIPPED | OUTPATIENT
Start: 2018-10-01 | End: 2019-02-19 | Stop reason: SDUPTHER

## 2018-10-01 RX ORDER — POTASSIUM CHLORIDE 750 MG/1
TABLET, FILM COATED, EXTENDED RELEASE ORAL
Qty: 180 TABLET | Refills: 0 | Status: SHIPPED | OUTPATIENT
Start: 2018-10-01 | End: 2019-02-19 | Stop reason: SDUPTHER

## 2018-10-01 RX ORDER — LOSARTAN POTASSIUM 50 MG/1
TABLET ORAL
Qty: 90 TABLET | Refills: 0 | Status: SHIPPED | OUTPATIENT
Start: 2018-10-01 | End: 2019-02-19 | Stop reason: SDUPTHER

## 2018-10-01 ASSESSMENT — ENCOUNTER SYMPTOMS
BACK PAIN: 1
COLOR CHANGE: 0
PHOTOPHOBIA: 0
NAUSEA: 0
CHEST TIGHTNESS: 0
SHORTNESS OF BREATH: 0
CONSTIPATION: 0
TROUBLE SWALLOWING: 0

## 2018-10-02 LAB
A/G RATIO: 2 (ref 1.1–2.2)
ALBUMIN SERPL-MCNC: 4.7 G/DL (ref 3.4–5)
ALP BLD-CCNC: 95 U/L (ref 40–129)
ALT SERPL-CCNC: 16 U/L (ref 10–40)
ANION GAP SERPL CALCULATED.3IONS-SCNC: 14 MMOL/L (ref 3–16)
AST SERPL-CCNC: 22 U/L (ref 15–37)
BILIRUB SERPL-MCNC: <0.2 MG/DL (ref 0–1)
BUN BLDV-MCNC: 14 MG/DL (ref 7–20)
CALCIUM SERPL-MCNC: 9.9 MG/DL (ref 8.3–10.6)
CHLORIDE BLD-SCNC: 102 MMOL/L (ref 99–110)
CO2: 26 MMOL/L (ref 21–32)
CREAT SERPL-MCNC: 0.7 MG/DL (ref 0.6–1.1)
GFR AFRICAN AMERICAN: >60
GFR NON-AFRICAN AMERICAN: >60
GLOBULIN: 2.3 G/DL
GLUCOSE BLD-MCNC: 93 MG/DL (ref 70–99)
HEPATITIS C ANTIBODY INTERPRETATION: NORMAL
POTASSIUM SERPL-SCNC: 3.3 MMOL/L (ref 3.5–5.1)
SODIUM BLD-SCNC: 142 MMOL/L (ref 136–145)
TOTAL PROTEIN: 7 G/DL (ref 6.4–8.2)

## 2019-01-07 ENCOUNTER — OFFICE VISIT (OUTPATIENT)
Dept: INTERNAL MEDICINE CLINIC | Age: 59
End: 2019-01-07

## 2019-01-07 VITALS
HEIGHT: 65 IN | RESPIRATION RATE: 18 BRPM | BODY MASS INDEX: 22.33 KG/M2 | WEIGHT: 134 LBS | DIASTOLIC BLOOD PRESSURE: 75 MMHG | HEART RATE: 70 BPM | SYSTOLIC BLOOD PRESSURE: 150 MMHG

## 2019-01-07 DIAGNOSIS — I10 BENIGN ESSENTIAL HTN: Primary | ICD-10-CM

## 2019-01-07 DIAGNOSIS — G35 MULTIPLE SCLEROSIS (HCC): ICD-10-CM

## 2019-01-07 DIAGNOSIS — F32.9 REACTIVE DEPRESSION: ICD-10-CM

## 2019-01-07 DIAGNOSIS — I10 BENIGN ESSENTIAL HTN: ICD-10-CM

## 2019-01-07 DIAGNOSIS — R52 GENERALIZED PAIN: Primary | ICD-10-CM

## 2019-01-07 DIAGNOSIS — G62.9 PERIPHERAL POLYNEUROPATHY: ICD-10-CM

## 2019-01-07 PROCEDURE — 81002 URINALYSIS NONAUTO W/O SCOPE: CPT | Performed by: INTERNAL MEDICINE

## 2019-01-07 PROCEDURE — 99213 OFFICE O/P EST LOW 20 MIN: CPT | Performed by: INTERNAL MEDICINE

## 2019-01-07 ASSESSMENT — ENCOUNTER SYMPTOMS
PHOTOPHOBIA: 0
CHEST TIGHTNESS: 0
TROUBLE SWALLOWING: 0
NAUSEA: 0
BACK PAIN: 1
CONSTIPATION: 0
COLOR CHANGE: 0
SHORTNESS OF BREATH: 0

## 2019-01-08 LAB
A/G RATIO: 2 (ref 1.1–2.2)
ALBUMIN SERPL-MCNC: 4.6 G/DL (ref 3.4–5)
ALP BLD-CCNC: 89 U/L (ref 40–129)
ALT SERPL-CCNC: 17 U/L (ref 10–40)
ANION GAP SERPL CALCULATED.3IONS-SCNC: 12 MMOL/L (ref 3–16)
AST SERPL-CCNC: 20 U/L (ref 15–37)
BILIRUB SERPL-MCNC: <0.2 MG/DL (ref 0–1)
BUN BLDV-MCNC: 15 MG/DL (ref 7–20)
CALCIUM SERPL-MCNC: 9.7 MG/DL (ref 8.3–10.6)
CHLORIDE BLD-SCNC: 103 MMOL/L (ref 99–110)
CO2: 30 MMOL/L (ref 21–32)
CREAT SERPL-MCNC: 0.7 MG/DL (ref 0.6–1.1)
FOLATE: >20 NG/ML (ref 4.78–24.2)
GFR AFRICAN AMERICAN: >60
GFR NON-AFRICAN AMERICAN: >60
GLOBULIN: 2.3 G/DL
GLUCOSE BLD-MCNC: 85 MG/DL (ref 70–99)
POTASSIUM SERPL-SCNC: 3.8 MMOL/L (ref 3.5–5.1)
SODIUM BLD-SCNC: 145 MMOL/L (ref 136–145)
TOTAL PROTEIN: 6.9 G/DL (ref 6.4–8.2)
VITAMIN B-12: 543 PG/ML (ref 211–911)

## 2019-01-11 ENCOUNTER — TELEPHONE (OUTPATIENT)
Dept: INTERNAL MEDICINE CLINIC | Age: 59
End: 2019-01-11

## 2019-01-11 RX ORDER — TRIAMTERENE AND HYDROCHLOROTHIAZIDE 37.5; 25 MG/1; MG/1
TABLET ORAL
Qty: 90 TABLET | Refills: 0 | Status: SHIPPED | OUTPATIENT
Start: 2019-01-11 | End: 2019-04-01 | Stop reason: SDUPTHER

## 2019-01-17 ENCOUNTER — TELEPHONE (OUTPATIENT)
Dept: INTERNAL MEDICINE CLINIC | Age: 59
End: 2019-01-17

## 2019-01-17 DIAGNOSIS — M54.9 SPINAL PAIN: Primary | ICD-10-CM

## 2019-02-19 RX ORDER — OMEPRAZOLE 20 MG/1
CAPSULE, DELAYED RELEASE ORAL
Qty: 90 CAPSULE | Refills: 0 | Status: SHIPPED | OUTPATIENT
Start: 2019-02-19 | End: 2019-04-01 | Stop reason: SDUPTHER

## 2019-02-19 RX ORDER — MONTELUKAST SODIUM 10 MG/1
TABLET ORAL
Qty: 90 TABLET | Refills: 0 | Status: SHIPPED | OUTPATIENT
Start: 2019-02-19 | End: 2019-04-01 | Stop reason: SDUPTHER

## 2019-02-19 RX ORDER — LOSARTAN POTASSIUM 50 MG/1
TABLET ORAL
Qty: 90 TABLET | Refills: 0 | Status: SHIPPED | OUTPATIENT
Start: 2019-02-19 | End: 2019-04-01 | Stop reason: SDUPTHER

## 2019-02-19 RX ORDER — POTASSIUM CHLORIDE 750 MG/1
TABLET, FILM COATED, EXTENDED RELEASE ORAL
Qty: 180 TABLET | Refills: 0 | Status: SHIPPED | OUTPATIENT
Start: 2019-02-19 | End: 2019-04-01 | Stop reason: SDUPTHER

## 2019-04-01 ENCOUNTER — OFFICE VISIT (OUTPATIENT)
Dept: INTERNAL MEDICINE CLINIC | Age: 59
End: 2019-04-01

## 2019-04-01 VITALS — WEIGHT: 142 LBS | BODY MASS INDEX: 23.66 KG/M2 | HEIGHT: 65 IN

## 2019-04-01 DIAGNOSIS — I10 BENIGN ESSENTIAL HTN: Primary | ICD-10-CM

## 2019-04-01 DIAGNOSIS — M51.36 DDD (DEGENERATIVE DISC DISEASE), LUMBAR: ICD-10-CM

## 2019-04-01 DIAGNOSIS — G35 MULTIPLE SCLEROSIS (HCC): ICD-10-CM

## 2019-04-01 DIAGNOSIS — G89.29 CHRONIC MIDLINE LOW BACK PAIN WITHOUT SCIATICA: ICD-10-CM

## 2019-04-01 DIAGNOSIS — I10 BENIGN ESSENTIAL HTN: ICD-10-CM

## 2019-04-01 DIAGNOSIS — M54.50 CHRONIC MIDLINE LOW BACK PAIN WITHOUT SCIATICA: ICD-10-CM

## 2019-04-01 DIAGNOSIS — F33.2 SEVERE EPISODE OF RECURRENT MAJOR DEPRESSIVE DISORDER, WITHOUT PSYCHOTIC FEATURES (HCC): ICD-10-CM

## 2019-04-01 LAB
BASOPHILS ABSOLUTE: 0 K/UL (ref 0–0.2)
BASOPHILS RELATIVE PERCENT: 0.6 %
EOSINOPHILS ABSOLUTE: 0.1 K/UL (ref 0–0.6)
EOSINOPHILS RELATIVE PERCENT: 3 %
HCT VFR BLD CALC: 40.7 % (ref 36–48)
HEMOGLOBIN: 13.3 G/DL (ref 12–16)
LYMPHOCYTES ABSOLUTE: 0.9 K/UL (ref 1–5.1)
LYMPHOCYTES RELATIVE PERCENT: 20.7 %
MCH RBC QN AUTO: 30.8 PG (ref 26–34)
MCHC RBC AUTO-ENTMCNC: 32.7 G/DL (ref 31–36)
MCV RBC AUTO: 94.2 FL (ref 80–100)
MONOCYTES ABSOLUTE: 0.3 K/UL (ref 0–1.3)
MONOCYTES RELATIVE PERCENT: 7.8 %
NEUTROPHILS ABSOLUTE: 3 K/UL (ref 1.7–7.7)
NEUTROPHILS RELATIVE PERCENT: 67.9 %
PDW BLD-RTO: 12.7 % (ref 12.4–15.4)
PLATELET # BLD: 229 K/UL (ref 135–450)
PMV BLD AUTO: 9.7 FL (ref 5–10.5)
RBC # BLD: 4.32 M/UL (ref 4–5.2)
WBC # BLD: 4.4 K/UL (ref 4–11)

## 2019-04-01 PROCEDURE — 99213 OFFICE O/P EST LOW 20 MIN: CPT | Performed by: INTERNAL MEDICINE

## 2019-04-01 RX ORDER — LOSARTAN POTASSIUM 50 MG/1
TABLET ORAL
Qty: 90 TABLET | Refills: 0 | Status: SHIPPED | OUTPATIENT
Start: 2019-04-01 | End: 2019-06-14 | Stop reason: SDUPTHER

## 2019-04-01 RX ORDER — MONTELUKAST SODIUM 10 MG/1
TABLET ORAL
Qty: 90 TABLET | Refills: 0 | Status: SHIPPED | OUTPATIENT
Start: 2019-04-01 | End: 2019-06-14 | Stop reason: SDUPTHER

## 2019-04-01 RX ORDER — OMEPRAZOLE 20 MG/1
CAPSULE, DELAYED RELEASE ORAL
Qty: 90 CAPSULE | Refills: 0 | Status: SHIPPED | OUTPATIENT
Start: 2019-04-01 | End: 2019-06-14 | Stop reason: SDUPTHER

## 2019-04-01 RX ORDER — DULOXETIN HYDROCHLORIDE 20 MG/1
20 CAPSULE, DELAYED RELEASE ORAL DAILY
Qty: 30 CAPSULE | Refills: 3 | Status: SHIPPED | OUTPATIENT
Start: 2019-04-01 | End: 2019-06-14 | Stop reason: SDUPTHER

## 2019-04-01 RX ORDER — TRIAMTERENE AND HYDROCHLOROTHIAZIDE 37.5; 25 MG/1; MG/1
TABLET ORAL
Qty: 90 TABLET | Refills: 0 | Status: SHIPPED | OUTPATIENT
Start: 2019-04-01 | End: 2019-06-14 | Stop reason: SDUPTHER

## 2019-04-01 RX ORDER — POTASSIUM CHLORIDE 750 MG/1
TABLET, FILM COATED, EXTENDED RELEASE ORAL
Qty: 180 TABLET | Refills: 0 | Status: SHIPPED | OUTPATIENT
Start: 2019-04-01 | End: 2019-06-14 | Stop reason: SDUPTHER

## 2019-04-01 NOTE — PATIENT INSTRUCTIONS
Patient Self-Management Goal for Health Maintenance  Goal: I will schedule a yearly preventative care visit.   Barriers: none  Plan for overcoming my barriers: N/A  Confidence: 10/10  Anticipated Goal Completion Date: 07/01/19

## 2019-04-01 NOTE — PROGRESS NOTES
discharge, hearing loss and trouble swallowing. Eyes: Negative for photophobia and visual disturbance. Respiratory: Negative for chest tightness and shortness of breath. Cardiovascular: Negative for chest pain, palpitations and leg swelling. Gastrointestinal: Negative for constipation and nausea. Endocrine: Negative for cold intolerance, heat intolerance and polyuria. Genitourinary: Negative for dysuria, flank pain and hematuria. Musculoskeletal: Positive for arthralgias and back pain. Negative for gait problem. Skin: Negative for color change. Allergic/Immunologic: Negative for immunocompromised state. Neurological: Positive for weakness. Negative for tremors, seizures and numbness. Psychiatric/Behavioral: Positive for depression  sleep disturbance. Negative for decreased concentration. The patient is not nervous/anxious. There are no changes to past medical history, family history, social history or review of systems(except as noted in the history section) since prior note (all reviewed with patient). Objective:   Physical Exam  There were no vitals filed for this visit. General:  Middle aged female Awake, alert and oriented. Appears to be not in any distress  Mucous Membranes:  Pink , anicteric  Neck: No JVD, no carotid bruit, no thyromegaly  Chest:  Clear to auscultation bilaterally, no added sounds  Cardiovascular:  RRR S1S2 heard, no murmurs or gallops  Abdomen:  Soft, undistended, non tender, no organomegaly, BS present  Extremities: Resolved edema,  Distal pulses well felt  Neurological : grossly normal except for left LE weakness  Depressed and crying again          MRI PELVIS         1. 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 2. Osteitis pubis with osseous erosion and stress edema of the pubic bones. 3. Bilateral mild SI arthropathy with osteoma of the iliac bones.    4. Spondylosis of L4-5 and L5-S1 with sterile endplate inflammation. 5. Mild bilateral hip arthrosis with spurring. MRI LS spine - from [facility]     There is lumbar dextroscoliosis which appears stable in comparison to prior study with no evidence of acute fracture or traumatic subluxation. Degenerative endplate marrow changes are seen surrounding the L2-3 disc level with no evidence to suggest metastatic disease. No abnormal signal is seen in the visualized spinal cord. At the L1-2 level there is minimal annular bulge with mild bilateral facet arthropathy. No significant canal narrowing is seen. No significant foraminal stenosis is seen bilaterally. At L2-3, there is broad-based annular bulge with bilateral facet arthropathy and ligamentous hypertrophy. There is a mild degree of resulting canal narrowing with mild narrowing of the left neural foramen with no right-sided foraminal stenosis. At L3-4, there is mild retrolisthesis with no significant disc bulge. There is bilateral facet arthropathy with mild to moderate degree of right-sided foraminal stenosis with moderate to severe narrowing of the left neural foramen. There is no significant canal stenosis seen at this level. At L4-5, there is a mild annular disc bulge with bilateral facet arthropathy. There is no significant canal stenosis. There is moderate to severe right-sided foraminal stenosis with mild narrowing of the left neural foramen. 6 at L5-S1, there is mild annular disc bulge resulting in mild ventral thecal sac compression with no significant canal stenosis. There is bilateral facet arthropathy with moderate right-sided foraminal narrowing with minimal narrowing of the left neural foramen. There has been no significant progression seen in comparison to prior examination. Assessment:       Diagnosis Orders   1. Benign essential HTN     2. Multiple sclerosis ([address]     3. DDD (degenerative disc disease), lumbar     4.  Chronic midline low back pain without sciatica             Plan:          HTN - well controlled now with losartan and  maxzide . Chronic arthritis -off nsaids for GERD issues     GERD - on ppi     MS - and with mild congnitive decline - maintained well on copaxone - f/w Dr. Radha Neves ( neurology )   On zonegran and provigil as well        Depression - started on cymbalta by her neurology but did not try it . Will refill   Pain is the cause of her major depression   Has intermittent bad thoughts but says she would not do anything. Wants to live for grandkids  Refer to pain mx      Low back pain with neuropathy of LE  - reports worsening pain issues  - refer to pain mx    Iron def anemia -   -suspect PUD with Nsaid use. Pt denied any black stools  - off nsaids and resolved anemia with iron supplements  - improved hb from 9 to 13.5  - recommend to stay off nsaids  - consider EGD and colonoscopy  if worsens      Fracture of right inferior ischiopubic ramus /anterior column of right acetabulum- from falls  Now f/w Dr. Rufina Wagner allergies - stable on singulair    High risk for falls - now using cane, advised quad cane      mammogram neg 7/16 - need again   Consider Dexa- ordered multiple times    need colonoscopy      Darylene Coil received counseling on the following healthy behaviors: nutrition, exercise and medication adherence  Reviewed prior labs and health maintenance  Continue current medications, diet and exercise. Discussed use, benefit, and side effects of prescribed medications. Barriers to medication compliance addressed. Patient given educational materials - see patient instructions  Was a self-tracking handout given in paper form or via [employer]?  Yes    Requested Prescriptions     Pending Prescriptions Disp Refills    losartan (COZAAR) 50 MG tablet 90 tablet 0     Sig: TAKE 1 TABLET BY MOUTH ONCE DAILY    potassium chloride (KLOR-CON) 10 MEQ extended release tablet 180 tablet 0     Sig: TAKE 1 TABLET BY MOUTH TWICE DAILY    omeprazole (PRILOSEC) 20 MG delayed release capsule 90 capsule 0     Sig: TAKE 1 CAPSULE BY MOUTH ONCE DAILY    montelukast (SINGULAIR) 10 MG tablet 90 tablet 0     Sig: TAKE 1 TABLET BY MOUTH IN THE EVENING    triamterene-hydrochlorothiazide (MAXZIDE-25) 37.5-25 MG per tablet 90 tablet 0     Sig: TAKE 1 TABLET BY MOUTH ONCE DAILY       All patient questions answered. Patient voiced understanding. Quality Measures    Body mass index is 23.63 kg/m². Normal. Weight control planned discussed Healthy diet and regular exercise. Blood pressure is normal. Treatment plan consists of No treatment change needed   . Lab Results   Component Value Date    [address] 05/12/2016    (goal LDL reduction with dx if diabetes is 50% LDL reduction)      PHQ Scores 2/12/2018 11/10/2015   PHQ2 Score 1 2   PHQ9 Score 1 2     Interpretation of Total Score Depression Severity: 1-4 = Minimal depression, 5-9 = Mild depression, 10-14 = Moderate depression, 15-19 = Moderately severe depression, 20-27 = Severe depression      Visit Information    Have you changed or started any medications since your last visit including any over-the-counter medicines, vitamins, or herbal medicines? no   Are you having any side effects from any of your medications? -  no  Have you stopped taking any of your medications? Is so, why? -  no    Have you seen any other physician or provider since your last visit? No  Have you had any other diagnostic tests since your last visit? No  Have you been seen in the emergency room and/or had an admission to a hospital since we last saw you? No  Have you had your routine dental cleaning in the past 6 months? no    Have you activated your [employer] account? If not, what are your barriers?  No:      Patient Care Team:  Rosa Maria Morley MD as PCP - General    Medical History Review  Past Medical, Family, and Social History reviewed and does not contribute to the patient presenting condition    Health Maintenance Topic Date Due    HIV screen  07/20/1977    DTaP/Tdap/Td vaccine (1 - Tdap) 07/20/1981    Cervical cancer screen  07/20/1983    Shingles Vaccine (1 of 2) 07/20/2012    Breast cancer screen  07/20/2018    Flu vaccine (Season Ended) 09/01/2019    Potassium monitoring  01/07/2020    Creatinine monitoring  01/07/2020    Colon cancer screen colonoscopy  01/25/2020    Lipid screen  05/12/2021    Hepatitis C screen  Completed

## 2019-04-02 ENCOUNTER — TELEPHONE (OUTPATIENT)
Dept: INTERNAL MEDICINE CLINIC | Age: 59
End: 2019-04-02

## 2019-04-02 LAB
A/G RATIO: 1.9 (ref 1.1–2.2)
ALBUMIN SERPL-MCNC: 4.7 G/DL (ref 3.4–5)
ALP BLD-CCNC: 100 U/L (ref 40–129)
ALT SERPL-CCNC: 15 U/L (ref 10–40)
ANION GAP SERPL CALCULATED.3IONS-SCNC: 9 MMOL/L (ref 3–16)
AST SERPL-CCNC: 18 U/L (ref 15–37)
BILIRUB SERPL-MCNC: <0.2 MG/DL (ref 0–1)
BUN BLDV-MCNC: 17 MG/DL (ref 7–20)
CALCIUM SERPL-MCNC: 10 MG/DL (ref 8.3–10.6)
CHLORIDE BLD-SCNC: 105 MMOL/L (ref 99–110)
CO2: 29 MMOL/L (ref 21–32)
CREAT SERPL-MCNC: 0.6 MG/DL (ref 0.6–1.1)
GFR AFRICAN AMERICAN: >60
GFR NON-AFRICAN AMERICAN: >60
GLOBULIN: 2.5 G/DL
GLUCOSE BLD-MCNC: 103 MG/DL (ref 70–99)
POTASSIUM SERPL-SCNC: 4.6 MMOL/L (ref 3.5–5.1)
SODIUM BLD-SCNC: 143 MMOL/L (ref 136–145)
TOTAL PROTEIN: 7.2 G/DL (ref 6.4–8.2)

## 2019-04-02 RX ORDER — AMOXICILLIN 500 MG/1
500 CAPSULE ORAL 3 TIMES DAILY
Qty: 15 CAPSULE | Refills: 0 | Status: SHIPPED | OUTPATIENT
Start: 2019-04-02 | End: 2019-04-07

## 2019-04-02 NOTE — TELEPHONE ENCOUNTER
----- Message from Nichole Powell MD sent at 4/2/2019 12:42 PM EDT -----  Contact: pt- [phone] amox 500tid x5 days    ----- Message -----  From: [place] Alba Fox  Sent: 4/2/2019   9:50 AM  To: Nichole Powell MD    She was seen yester.-said you had mentioned calling in amoxicillin for her and the pharm. did not have it - [facility] in [place] -last appt- 4-1-19-next appt- 7-23-19-

## 2019-06-13 ENCOUNTER — TELEPHONE (OUTPATIENT)
Dept: INTERNAL MEDICINE CLINIC | Age: 59
End: 2019-06-13

## 2019-06-13 NOTE — TELEPHONE ENCOUNTER
----- Message from Melanie Bills MD sent at 6/13/2019  9:50 AM EDT -----  Contact: pt called [phone]  Yes ok to come    ----- Message -----  From: Fae Nyhan  Sent: 6/12/2019  11:56 AM  To: Melanie Bills MD    Pt was seen in the hospital last night for severe side pain. Her  has an appointment on Friday at 9:20. She wants to know if she could come in around that time for a hospital follow up. Could we let her know at [phone].

## 2019-06-14 ENCOUNTER — OFFICE VISIT (OUTPATIENT)
Dept: INTERNAL MEDICINE CLINIC | Age: 59
End: 2019-06-14

## 2019-06-14 ENCOUNTER — TELEPHONE (OUTPATIENT)
Dept: INTERNAL MEDICINE CLINIC | Age: 59
End: 2019-06-14

## 2019-06-14 VITALS
DIASTOLIC BLOOD PRESSURE: 75 MMHG | HEART RATE: 70 BPM | WEIGHT: 146 LBS | BODY MASS INDEX: 24.32 KG/M2 | SYSTOLIC BLOOD PRESSURE: 130 MMHG | RESPIRATION RATE: 18 BRPM | HEIGHT: 65 IN

## 2019-06-14 DIAGNOSIS — S22.080A T12 COMPRESSION FRACTURE (HCC): ICD-10-CM

## 2019-06-14 DIAGNOSIS — M54.9 ACUTE BACK PAIN LESS THAN 4 WEEKS DURATION: Primary | ICD-10-CM

## 2019-06-14 DIAGNOSIS — M51.36 DDD (DEGENERATIVE DISC DISEASE), LUMBAR: Primary | ICD-10-CM

## 2019-06-14 DIAGNOSIS — M51.36 DDD (DEGENERATIVE DISC DISEASE), LUMBAR: ICD-10-CM

## 2019-06-14 DIAGNOSIS — M48.00 CENTRAL STENOSIS OF SPINAL CANAL: ICD-10-CM

## 2019-06-14 PROBLEM — M51.369 DDD (DEGENERATIVE DISC DISEASE), LUMBAR: Status: ACTIVE | Noted: 2019-06-14

## 2019-06-14 PROCEDURE — 1111F DSCHRG MED/CURRENT MED MERGE: CPT | Performed by: INTERNAL MEDICINE

## 2019-06-14 PROCEDURE — 99496 TRANSJ CARE MGMT HIGH F2F 7D: CPT | Performed by: INTERNAL MEDICINE

## 2019-06-14 RX ORDER — LOSARTAN POTASSIUM 50 MG/1
TABLET ORAL
Qty: 90 TABLET | Refills: 0 | Status: SHIPPED | OUTPATIENT
Start: 2019-06-14 | End: 2019-11-21 | Stop reason: SDUPTHER

## 2019-06-14 RX ORDER — DULOXETIN HYDROCHLORIDE 20 MG/1
20 CAPSULE, DELAYED RELEASE ORAL DAILY
Qty: 30 CAPSULE | Refills: 3 | Status: SHIPPED | OUTPATIENT
Start: 2019-06-14 | End: 2019-11-21 | Stop reason: SDUPTHER

## 2019-06-14 RX ORDER — MONTELUKAST SODIUM 10 MG/1
TABLET ORAL
Qty: 90 TABLET | Refills: 0 | Status: SHIPPED | OUTPATIENT
Start: 2019-06-14 | End: 2019-11-21 | Stop reason: SDUPTHER

## 2019-06-14 RX ORDER — OMEPRAZOLE 20 MG/1
CAPSULE, DELAYED RELEASE ORAL
Qty: 90 CAPSULE | Refills: 0 | Status: SHIPPED | OUTPATIENT
Start: 2019-06-14 | End: 2019-11-21 | Stop reason: SDUPTHER

## 2019-06-14 RX ORDER — TRIAMTERENE AND HYDROCHLOROTHIAZIDE 37.5; 25 MG/1; MG/1
TABLET ORAL
Qty: 90 TABLET | Refills: 0 | Status: SHIPPED | OUTPATIENT
Start: 2019-06-14 | End: 2019-10-08 | Stop reason: SDUPTHER

## 2019-06-14 RX ORDER — POTASSIUM CHLORIDE 750 MG/1
TABLET, FILM COATED, EXTENDED RELEASE ORAL
Qty: 180 TABLET | Refills: 0 | Status: SHIPPED | OUTPATIENT
Start: 2019-06-14 | End: 2019-11-21 | Stop reason: SDUPTHER

## 2019-06-14 RX ORDER — TRAMADOL HYDROCHLORIDE 50 MG/1
50 TABLET ORAL 3 TIMES DAILY PRN
Qty: 30 TABLET | Refills: 0 | Status: SHIPPED | OUTPATIENT
Start: 2019-06-14 | End: 2019-06-24

## 2019-06-14 RX ORDER — LIDOCAINE 4 G/G
1 PATCH TOPICAL DAILY
Qty: 30 PATCH | Refills: 0 | Status: SHIPPED | OUTPATIENT
Start: 2019-06-14 | End: 2019-07-14

## 2019-06-14 RX ORDER — CYCLOBENZAPRINE HCL 10 MG
10 TABLET ORAL 2 TIMES DAILY PRN
Qty: 30 TABLET | Refills: 0 | Status: SHIPPED | OUTPATIENT
Start: 2019-06-14 | End: 2019-06-28 | Stop reason: SDUPTHER

## 2019-06-14 NOTE — PROGRESS NOTES
Post-Discharge Transitional Care Management Services or Hospital Follow Up      Wendy Daniels   YOB: 1960    Date of Office Visit:  6/14/2019  Date of Hospital Admission:    Date of Hospital Discharge:    Readmission Risk Score(high >=14%. Medium >=10%):No data recorded    Care management risk score Rising risk (score 2-5) and Complex Care (Scores >=6): 4     Non face to face  following discharge, date last encounter closed (first attempt may have been earlier): *No documented post hospital discharge outreach found in the last 14 days *No documented post hospital discharge outreach found in the last 14 days    Call initiated 2 business days of discharge: *No response recorded in the last 14 days     Patient Active Problem List   Diagnosis    Benign essential HTN    [facility]    Peripheral polyneuropathy    Chronic left shoulder pain    Cervical radiculopathy    Adhesive capsulitis of left shoulder    Shoulder impingement    Left shoulder pain    Menopausal state    Closed fracture of pubic ramus ([facility]    Reactive depression    Severe episode of recurrent major depressive disorder, without psychotic features (HCC)       Allergies   Allergen Reactions    Flagyl [Metronidazole]     Sulfa Antibiotics        Medications listed as ordered at the time of discharge from hospital   [place], [address] Medication Instructions JOSEPH:    Printed on:06/14/19 3529   Medication Information                      ciclopirox (PENLAC) 8 % solution  APPLY  SOLUTION TOPICALLY TO AFFECTED AREA NIGHTLY             DULoxetine (CYMBALTA) 20 MG extended release capsule  Take 1 capsule by mouth daily             EPINEPHrine (EPIPEN 2-CONSTANZA) 0.3 MG/0.3ML SOAJ injection  Use as directed for allergic reaction             gabapentin (NEURONTIN) 400 MG capsule  Take 1 capsule by mouth 2 times daily             Glatiramer Acetate (COPAXONE SC)  Inject  into the skin.              losartan (COZAAR) 50 MG tablet  TAKE 1 TABLET BY MOUTH ONCE DAILY             montelukast (SINGULAIR) 10 MG tablet  TAKE 1 TABLET BY MOUTH IN THE EVENING             omeprazole (PRILOSEC) 20 MG delayed release capsule  TAKE 1 CAPSULE BY MOUTH ONCE DAILY             potassium chloride (KLOR-CON) 10 MEQ extended release tablet  TAKE 1 TABLET BY MOUTH TWICE DAILY             triamterene-hydrochlorothiazide (MAXZIDE-25) 37.5-25 MG per tablet  TAKE 1 TABLET BY MOUTH ONCE DAILY                   Medications marked \"taking\" at this time  No outpatient medications have been marked as taking for the 6/14/19 encounter (Office Visit) with Dagoberto Singh MD.        Medications patient taking as of now reconciled against medications ordered at time of hospital discharge: Yes    Chief Complaint   Patient presents with   [address] from Hospital       HPI     66 y.o. Female with chronic back pain, HTN,, MS on copaxone,   varicose veins, chronic pedal edema  Here for recent hospital discharge jeffrey     Apparently fell at home when her dog pulled her , landed on her right side on a old iron chair causing significant bruise to right flank and severe pain. Seen at [facility], noted to have T12 compression fracture and was admitted overnight for PT eval and pain control. Given morphine in hospital and discharged with no new pain meds  Pt reports she is taking norco as before but pain is very severe. Cannot sit straight or walk straight. Radiating pain to right flank and lower pelvis. Constipated with pain pills. Worried about recurrent falls      Inpatient course: Discharge summary reviewed- see chart. Interval history/Current status: home with     Review of Systems   As above      Vitals:    06/14/19 0913   BP: 130/75   Pulse: 70   Resp: 18   Weight: 146 lb (66.2 kg)   Height: 5' 5\" (1.651 m)     Body mass index is 24.3 kg/m².    Wt Readings from Last 3 Encounters:   06/14/19 146 lb (66.2 kg)   04/01/19 142 lb (64.4 kg)   01/07/19 134 lb (60.8 kg)     BP Readings from Last 3 Encounters:   06/14/19 130/75   01/07/19 (!) 150/75   10/01/18 130/75       Physical Exam        General:  Middle aged female Awake, alert and oriented. Appears to be in painful distress  Mucous Membranes:  Pink , anicteric  Neck: No JVD, no carotid bruit, no thyromegaly  Chest:  Clear to auscultation bilaterally, no added sounds  Cardiovascular:  RRR S1S2 heard, no murmurs or gallops  Abdomen: right flank bruise with severe lower thoracic and upper lumbar pain    Soft, undistended, non tender, no organomegaly, BS present  Extremities: No edema or cyanosis. Distal pulses well felt  Neurological : grossly normal        Ct thoracic and lumbar spine    Thoracic spine:  Mild superior endplate compression fracture of T12 with minimal retropulsion causing no spinal canal compromise. Given that there is no visible radiolucent fracture line or definite paraspinal hematoma, the fracture is favored subacute to chronic but remains age indeterminate. If there is further clinical concern, MRI would be recommended. No other fracture or evidence of subluxation. Degenerative changes as above. Mild central canal stenosis at T11-T12. Lumbar spine:  No acute fracture identified. Dextroconvex scoliotic curvature of the lumbar spine. There is also multilevel listhesis. At least mild central canal stenosis at most levels, with probable mild to moderate central canal stenosis at L2-L3. Severe right L3, right L4, and right L5 foraminal stenosis. Ct chest    No evidence of pulmonary embolism or acute intrathoracic abnormality. Scattered pulmonary nodules measuring up to 5 mm. Following the Fleischner Society 2017 guidelines for multiple solid nodules <6 mm, if patient is low risk no routine follow-up is suggested. If patient is high risk, then optional CT at 12 months is suggested. qzxv    Age-indeterminate mild superior endplate compression fracture of T12.     Preliminary interpretation was provided by [facility]. Assessment/Plan:   Diagnosis Orders   1. Acute back pain less than 4 weeks duration     2. T12 compression fracture ([address]     3. DDD (degenerative disc disease), lumbar     4.  Central stenosis of spinal canal       Fall with T12 compression fracture   - refer to pain management given significant pain issues from before with lumbar spinal canal stenosis  - continue norco. Add lidocaine patch, flexeril   - can add tramadol if not better  - need dexa scan  - continue stool softners    Chronic low back pain - CT with lumbar canal stenosis  - refer to pain mx      Medical Decision Making: moderate complexity

## 2019-06-14 NOTE — TELEPHONE ENCOUNTER
----- Message from Yary Vizcaino MD sent at 6/14/2019 12:23 PM EDT -----  Contact: Daysi Walker [phone]  Santos Cruz if insurance covers 5 % lidocaine  15 day supply on flexeril    ----- Message -----  From: Nelson Tay  Sent: 6/14/2019  10:01 AM  To: Yary Vizcaino MD    Pharmacy wanting to know if they can change pt's lidocaine to 5 % as they don't carry the 4%. Also wanting to know if the flexeril should be a 15 or 30 day supply? Please advise.

## 2019-06-14 NOTE — TELEPHONE ENCOUNTER
----- Message from Donna Troy MD sent at 6/14/2019  3:40 PM EDT -----  Contact: pt called [phone]  Send tramadol 50 mg tid prn #30    ----- Message -----  From: Anne-Marie Kishore  Sent: 6/14/2019   3:14 PM  To: Donna Troy MD    Pt said flexeril was called in instead of tramadol. She thought you were going to call the tramadol in for her. The flexeril does not help the pt, could you call in tramadol to [facility] in [facility] (Phone).  Next brendon 9/25

## 2019-07-01 ENCOUNTER — TELEPHONE (OUTPATIENT)
Dept: INTERNAL MEDICINE CLINIC | Age: 59
End: 2019-07-01

## 2019-07-01 DIAGNOSIS — M51.36 DDD (DEGENERATIVE DISC DISEASE), LUMBAR: ICD-10-CM

## 2019-07-01 DIAGNOSIS — S22.080A T12 COMPRESSION FRACTURE (HCC): ICD-10-CM

## 2019-07-01 RX ORDER — TRAMADOL HYDROCHLORIDE 50 MG/1
TABLET ORAL
Qty: 30 TABLET | Refills: 0 | OUTPATIENT
Start: 2019-07-01 | End: 2019-07-11

## 2019-07-01 NOTE — TELEPHONE ENCOUNTER
I spoke with 's office and they said that pt is not under contract with them because he is not prescribing pain medication to pt. Pt currently receiving Hunter from  who pt said is her neurologist. I informed pt that the Tramadol  prescribed in June was only a temporary supply and he would not refill this because she is receiving pain medication from another doctor. Pt was upset and wanting to know what to do because she said Daisy Santamaria told her not to come back. I told her that per 's note it looks like they advised her to contact them if she decided she wanted other pain intervention or if  wanted  to take over opiods. Pt said she will not get injections. I told her that unfortunately  will not send in any pain medication for her at this time. ----- Message from Bonnie Godoy sent at 7/1/2019 10:26 AM EDT -----  LVM for 's office to return call to confirm or deny what pt said.   ----- Message -----  From: Veronika Sotelo MD  Sent: 7/1/2019   7:42 AM  To: Bonnie Godoy    I did not say that  I only gave temporary supply ,   She has to find a pain mx    ----- Message -----  From: Bonnie Godoy  Sent: 6/28/2019   4:09 PM  To: Veronika Sotelo MD    Pt requesting refills on flexeril and tramadol. Per pt  told her to have you manage pain medication. I don't see any mention of that in his note but it doesn't appear that he's finished charting. Please advise.

## 2019-07-30 ENCOUNTER — TELEPHONE (OUTPATIENT)
Dept: INTERNAL MEDICINE CLINIC | Age: 59
End: 2019-07-30

## 2019-10-08 RX ORDER — TRIAMTERENE AND HYDROCHLOROTHIAZIDE 37.5; 25 MG/1; MG/1
TABLET ORAL
Qty: 90 TABLET | Refills: 0 | Status: SHIPPED | OUTPATIENT
Start: 2019-10-08 | End: 2019-12-06 | Stop reason: SDUPTHER

## 2019-11-21 RX ORDER — MONTELUKAST SODIUM 10 MG/1
TABLET ORAL
Qty: 90 TABLET | Refills: 0 | Status: SHIPPED | OUTPATIENT
Start: 2019-11-21 | End: 2019-12-06 | Stop reason: SDUPTHER

## 2019-11-21 RX ORDER — OMEPRAZOLE 20 MG/1
CAPSULE, DELAYED RELEASE ORAL
Qty: 90 CAPSULE | Refills: 0 | Status: SHIPPED | OUTPATIENT
Start: 2019-11-21 | End: 2019-12-06 | Stop reason: SDUPTHER

## 2019-11-21 RX ORDER — POTASSIUM CHLORIDE 750 MG/1
TABLET, FILM COATED, EXTENDED RELEASE ORAL
Qty: 180 TABLET | Refills: 0 | Status: SHIPPED | OUTPATIENT
Start: 2019-11-21 | End: 2019-12-06 | Stop reason: SDUPTHER

## 2019-11-21 RX ORDER — DULOXETIN HYDROCHLORIDE 20 MG/1
CAPSULE, DELAYED RELEASE ORAL
Qty: 90 CAPSULE | Refills: 0 | Status: SHIPPED | OUTPATIENT
Start: 2019-11-21 | End: 2019-12-06 | Stop reason: SDUPTHER

## 2019-11-21 RX ORDER — LOSARTAN POTASSIUM 50 MG/1
TABLET ORAL
Qty: 90 TABLET | Refills: 0 | Status: SHIPPED | OUTPATIENT
Start: 2019-11-21 | End: 2019-12-06 | Stop reason: SDUPTHER

## 2019-12-06 ENCOUNTER — OFFICE VISIT (OUTPATIENT)
Dept: INTERNAL MEDICINE CLINIC | Age: 59
End: 2019-12-06

## 2019-12-06 VITALS
RESPIRATION RATE: 18 BRPM | HEIGHT: 65 IN | HEART RATE: 70 BPM | WEIGHT: 143 LBS | SYSTOLIC BLOOD PRESSURE: 110 MMHG | DIASTOLIC BLOOD PRESSURE: 62 MMHG | BODY MASS INDEX: 23.82 KG/M2

## 2019-12-06 DIAGNOSIS — I10 BENIGN ESSENTIAL HTN: ICD-10-CM

## 2019-12-06 DIAGNOSIS — M48.00 CENTRAL STENOSIS OF SPINAL CANAL: ICD-10-CM

## 2019-12-06 DIAGNOSIS — I10 BENIGN ESSENTIAL HTN: Primary | ICD-10-CM

## 2019-12-06 DIAGNOSIS — M51.36 DDD (DEGENERATIVE DISC DISEASE), LUMBAR: ICD-10-CM

## 2019-12-06 DIAGNOSIS — Z12.39 BREAST CANCER SCREENING: ICD-10-CM

## 2019-12-06 DIAGNOSIS — F32.9 REACTIVE DEPRESSION: ICD-10-CM

## 2019-12-06 DIAGNOSIS — G35 MULTIPLE SCLEROSIS (HCC): ICD-10-CM

## 2019-12-06 DIAGNOSIS — S22.080K COMPRESSION FRACTURE OF T12 VERTEBRA WITH NONUNION, SUBSEQUENT ENCOUNTER: ICD-10-CM

## 2019-12-06 LAB
A/G RATIO: 1.5 (ref 1.1–2.2)
ALBUMIN SERPL-MCNC: 4.3 G/DL (ref 3.4–5)
ALP BLD-CCNC: 111 U/L (ref 40–129)
ALT SERPL-CCNC: 17 U/L (ref 10–40)
ANION GAP SERPL CALCULATED.3IONS-SCNC: 12 MMOL/L (ref 3–16)
AST SERPL-CCNC: 22 U/L (ref 15–37)
BASOPHILS ABSOLUTE: 0 K/UL (ref 0–0.2)
BASOPHILS RELATIVE PERCENT: 0.5 %
BILIRUB SERPL-MCNC: 0.3 MG/DL (ref 0–1)
BUN BLDV-MCNC: 17 MG/DL (ref 7–20)
CALCIUM SERPL-MCNC: 9.8 MG/DL (ref 8.3–10.6)
CHLORIDE BLD-SCNC: 102 MMOL/L (ref 99–110)
CO2: 27 MMOL/L (ref 21–32)
CREAT SERPL-MCNC: 0.7 MG/DL (ref 0.6–1.1)
EOSINOPHILS ABSOLUTE: 0.1 K/UL (ref 0–0.6)
EOSINOPHILS RELATIVE PERCENT: 3.2 %
GFR AFRICAN AMERICAN: >60
GFR NON-AFRICAN AMERICAN: >60
GLOBULIN: 2.9 G/DL
GLUCOSE BLD-MCNC: 101 MG/DL (ref 70–99)
HCT VFR BLD CALC: 39.5 % (ref 36–48)
HEMOGLOBIN: 12.7 G/DL (ref 12–16)
LYMPHOCYTES ABSOLUTE: 0.9 K/UL (ref 1–5.1)
LYMPHOCYTES RELATIVE PERCENT: 19.2 %
MCH RBC QN AUTO: 30.9 PG (ref 26–34)
MCHC RBC AUTO-ENTMCNC: 32.2 G/DL (ref 31–36)
MCV RBC AUTO: 96 FL (ref 80–100)
MONOCYTES ABSOLUTE: 0.4 K/UL (ref 0–1.3)
MONOCYTES RELATIVE PERCENT: 8 %
NEUTROPHILS ABSOLUTE: 3.3 K/UL (ref 1.7–7.7)
NEUTROPHILS RELATIVE PERCENT: 69.1 %
PDW BLD-RTO: 13.2 % (ref 12.4–15.4)
PLATELET # BLD: 228 K/UL (ref 135–450)
PMV BLD AUTO: 10 FL (ref 5–10.5)
POTASSIUM SERPL-SCNC: 4.7 MMOL/L (ref 3.5–5.1)
RBC # BLD: 4.12 M/UL (ref 4–5.2)
SODIUM BLD-SCNC: 141 MMOL/L (ref 136–145)
TOTAL PROTEIN: 7.2 G/DL (ref 6.4–8.2)
WBC # BLD: 4.7 K/UL (ref 4–11)

## 2019-12-06 PROCEDURE — 99213 OFFICE O/P EST LOW 20 MIN: CPT | Performed by: INTERNAL MEDICINE

## 2019-12-06 RX ORDER — DULOXETIN HYDROCHLORIDE 20 MG/1
CAPSULE, DELAYED RELEASE ORAL
Qty: 90 CAPSULE | Refills: 0 | Status: SHIPPED | OUTPATIENT
Start: 2019-12-06 | End: 2020-05-21

## 2019-12-06 RX ORDER — LOSARTAN POTASSIUM 50 MG/1
TABLET ORAL
Qty: 90 TABLET | Refills: 0 | Status: SHIPPED | OUTPATIENT
Start: 2019-12-06 | End: 2020-06-01

## 2019-12-06 RX ORDER — POTASSIUM CHLORIDE 750 MG/1
TABLET, FILM COATED, EXTENDED RELEASE ORAL
Qty: 180 TABLET | Refills: 0 | Status: SHIPPED | OUTPATIENT
Start: 2019-12-06 | End: 2020-05-21

## 2019-12-06 RX ORDER — MONTELUKAST SODIUM 10 MG/1
TABLET ORAL
Qty: 90 TABLET | Refills: 0 | Status: SHIPPED | OUTPATIENT
Start: 2019-12-06 | End: 2020-06-01

## 2019-12-06 RX ORDER — OMEPRAZOLE 20 MG/1
CAPSULE, DELAYED RELEASE ORAL
Qty: 90 CAPSULE | Refills: 0 | Status: SHIPPED | OUTPATIENT
Start: 2019-12-06 | End: 2020-05-21

## 2019-12-06 RX ORDER — TRIAMTERENE AND HYDROCHLOROTHIAZIDE 37.5; 25 MG/1; MG/1
TABLET ORAL
Qty: 90 TABLET | Refills: 0 | Status: SHIPPED | OUTPATIENT
Start: 2019-12-06 | End: 2020-03-24

## 2019-12-06 ASSESSMENT — PATIENT HEALTH QUESTIONNAIRE - PHQ9
1. LITTLE INTEREST OR PLEASURE IN DOING THINGS: 0
SUM OF ALL RESPONSES TO PHQ9 QUESTIONS 1 & 2: 1
SUM OF ALL RESPONSES TO PHQ QUESTIONS 1-9: 1
2. FEELING DOWN, DEPRESSED OR HOPELESS: 1
SUM OF ALL RESPONSES TO PHQ QUESTIONS 1-9: 1

## 2020-03-24 RX ORDER — TRIAMTERENE AND HYDROCHLOROTHIAZIDE 37.5; 25 MG/1; MG/1
TABLET ORAL
Qty: 90 TABLET | Refills: 0 | Status: SHIPPED | OUTPATIENT
Start: 2020-03-24 | End: 2020-06-12 | Stop reason: SDUPTHER

## 2020-05-04 ENCOUNTER — TELEPHONE (OUTPATIENT)
Dept: INTERNAL MEDICINE CLINIC | Age: 60
End: 2020-05-04

## 2020-05-04 RX ORDER — FUROSEMIDE 20 MG/1
20 TABLET ORAL DAILY PRN
Qty: 30 TABLET | Refills: 0 | Status: SHIPPED | OUTPATIENT
Start: 2020-05-04 | End: 2020-06-12 | Stop reason: SDUPTHER

## 2020-05-21 RX ORDER — OMEPRAZOLE 20 MG/1
CAPSULE, DELAYED RELEASE ORAL
Qty: 90 CAPSULE | Refills: 0 | Status: SHIPPED | OUTPATIENT
Start: 2020-05-21 | End: 2020-06-12 | Stop reason: SDUPTHER

## 2020-05-21 RX ORDER — EPINEPHRINE 0.3 MG/.3ML
INJECTION SUBCUTANEOUS
Qty: 1 EACH | Refills: 2 | Status: SHIPPED | OUTPATIENT
Start: 2020-05-21 | End: 2021-07-19

## 2020-05-21 RX ORDER — DULOXETIN HYDROCHLORIDE 20 MG/1
CAPSULE, DELAYED RELEASE ORAL
Qty: 90 CAPSULE | Refills: 0 | Status: SHIPPED | OUTPATIENT
Start: 2020-05-21 | End: 2021-07-26 | Stop reason: ALTCHOICE

## 2020-05-21 RX ORDER — POTASSIUM CHLORIDE 750 MG/1
TABLET, FILM COATED, EXTENDED RELEASE ORAL
Qty: 180 TABLET | Refills: 0 | Status: SHIPPED | OUTPATIENT
Start: 2020-05-21 | End: 2020-06-12 | Stop reason: SDUPTHER

## 2020-06-01 RX ORDER — MONTELUKAST SODIUM 10 MG/1
TABLET ORAL
Qty: 90 TABLET | Refills: 0 | Status: SHIPPED | OUTPATIENT
Start: 2020-06-01 | End: 2020-09-01

## 2020-06-01 RX ORDER — LOSARTAN POTASSIUM 50 MG/1
TABLET ORAL
Qty: 90 TABLET | Refills: 0 | Status: SHIPPED | OUTPATIENT
Start: 2020-06-01 | End: 2020-09-01

## 2020-06-12 ENCOUNTER — OFFICE VISIT (OUTPATIENT)
Dept: INTERNAL MEDICINE CLINIC | Age: 60
End: 2020-06-12

## 2020-06-12 VITALS
RESPIRATION RATE: 18 BRPM | SYSTOLIC BLOOD PRESSURE: 135 MMHG | WEIGHT: 148 LBS | HEIGHT: 65 IN | HEART RATE: 70 BPM | BODY MASS INDEX: 24.66 KG/M2 | DIASTOLIC BLOOD PRESSURE: 75 MMHG

## 2020-06-12 DIAGNOSIS — I10 BENIGN ESSENTIAL HTN: ICD-10-CM

## 2020-06-12 LAB
A/G RATIO: 1.5 (ref 1.1–2.2)
ALBUMIN SERPL-MCNC: 4.3 G/DL (ref 3.4–5)
ALP BLD-CCNC: 107 U/L (ref 40–129)
ALT SERPL-CCNC: 11 U/L (ref 10–40)
ANION GAP SERPL CALCULATED.3IONS-SCNC: 14 MMOL/L (ref 3–16)
AST SERPL-CCNC: 19 U/L (ref 15–37)
BASOPHILS ABSOLUTE: 0.1 K/UL (ref 0–0.2)
BASOPHILS RELATIVE PERCENT: 1.3 %
BILIRUB SERPL-MCNC: <0.2 MG/DL (ref 0–1)
BUN BLDV-MCNC: 21 MG/DL (ref 7–20)
CALCIUM SERPL-MCNC: 9.7 MG/DL (ref 8.3–10.6)
CHLORIDE BLD-SCNC: 104 MMOL/L (ref 99–110)
CO2: 25 MMOL/L (ref 21–32)
CREAT SERPL-MCNC: 0.8 MG/DL (ref 0.6–1.1)
EOSINOPHILS ABSOLUTE: 0.1 K/UL (ref 0–0.6)
EOSINOPHILS RELATIVE PERCENT: 2.8 %
GFR AFRICAN AMERICAN: >60
GFR NON-AFRICAN AMERICAN: >60
GLOBULIN: 2.8 G/DL
GLUCOSE BLD-MCNC: 94 MG/DL (ref 70–99)
HCT VFR BLD CALC: 39.7 % (ref 36–48)
HEMOGLOBIN: 12.9 G/DL (ref 12–16)
LYMPHOCYTES ABSOLUTE: 1 K/UL (ref 1–5.1)
LYMPHOCYTES RELATIVE PERCENT: 20.8 %
MCH RBC QN AUTO: 30.5 PG (ref 26–34)
MCHC RBC AUTO-ENTMCNC: 32.4 G/DL (ref 31–36)
MCV RBC AUTO: 94.2 FL (ref 80–100)
MONOCYTES ABSOLUTE: 0.5 K/UL (ref 0–1.3)
MONOCYTES RELATIVE PERCENT: 9.5 %
NEUTROPHILS ABSOLUTE: 3.2 K/UL (ref 1.7–7.7)
NEUTROPHILS RELATIVE PERCENT: 65.6 %
PDW BLD-RTO: 12.7 % (ref 12.4–15.4)
PLATELET # BLD: 248 K/UL (ref 135–450)
PMV BLD AUTO: 9 FL (ref 5–10.5)
POTASSIUM SERPL-SCNC: 4.5 MMOL/L (ref 3.5–5.1)
RBC # BLD: 4.22 M/UL (ref 4–5.2)
SODIUM BLD-SCNC: 143 MMOL/L (ref 136–145)
TOTAL PROTEIN: 7.1 G/DL (ref 6.4–8.2)
WBC # BLD: 4.8 K/UL (ref 4–11)

## 2020-06-12 PROCEDURE — 99214 OFFICE O/P EST MOD 30 MIN: CPT | Performed by: INTERNAL MEDICINE

## 2020-06-12 RX ORDER — FUROSEMIDE 20 MG/1
20 TABLET ORAL DAILY PRN
Qty: 30 TABLET | Refills: 0 | Status: SHIPPED | OUTPATIENT
Start: 2020-06-12 | End: 2020-11-13 | Stop reason: SDUPTHER

## 2020-06-12 RX ORDER — TRIAMTERENE AND HYDROCHLOROTHIAZIDE 37.5; 25 MG/1; MG/1
TABLET ORAL
Qty: 90 TABLET | Refills: 0 | Status: SHIPPED | OUTPATIENT
Start: 2020-06-12 | End: 2020-09-28

## 2020-06-12 RX ORDER — DULOXETIN HYDROCHLORIDE 20 MG/1
CAPSULE, DELAYED RELEASE ORAL
Qty: 90 CAPSULE | Refills: 0 | Status: CANCELLED | OUTPATIENT
Start: 2020-06-12

## 2020-06-12 RX ORDER — POTASSIUM CHLORIDE 750 MG/1
TABLET, FILM COATED, EXTENDED RELEASE ORAL
Qty: 180 TABLET | Refills: 0 | Status: SHIPPED | OUTPATIENT
Start: 2020-06-12 | End: 2020-11-13 | Stop reason: SDUPTHER

## 2020-06-12 RX ORDER — OMEPRAZOLE 20 MG/1
CAPSULE, DELAYED RELEASE ORAL
Qty: 90 CAPSULE | Refills: 0 | Status: SHIPPED | OUTPATIENT
Start: 2020-06-12 | End: 2020-11-13 | Stop reason: SDUPTHER

## 2020-06-12 NOTE — PROGRESS NOTES
MUSCLE SPASM 30 tablet 0    ciclopirox (PENLAC) 8 % solution APPLY  SOLUTION TOPICALLY TO AFFECTED AREA NIGHTLY 7 Bottle 0    gabapentin (NEURONTIN) 400 MG capsule Take 1 capsule by mouth 2 times daily 180 capsule 1    Glatiramer Acetate (COPAXONE SC) Inject  into the skin. No current facility-administered medications for this visit. Review of Systems   Constitutional: Negative for activity change, diaphoresis and unexpected weight change. HENT: Negative for congestion, ear discharge, hearing loss and trouble swallowing. Eyes: Negative for photophobia and visual disturbance. Respiratory: Negative for chest tightness and shortness of breath. Cardiovascular: Negative for chest pain, palpitations and leg swelling. Gastrointestinal: Negative for constipation and nausea. Endocrine: Negative for cold intolerance, heat intolerance and polyuria. Genitourinary: Negative for dysuria, flank pain and hematuria. Musculoskeletal: Positive for arthralgias and back pain. Negative for gait problem. Skin: Negative for color change. Allergic/Immunologic: Negative for immunocompromised state. Neurological: Positive for weakness. Negative for tremors, seizures and numbness. Psychiatric/Behavioral: Positive for depression  sleep disturbance. Negative for decreased concentration. The patient is not nervous/anxious. There are no changes to past medical history, family history, social history or review of systems(except as noted in the history section) since prior note (all reviewed with patient). Objective:   Physical Exam  Vitals:    06/12/20 0928   BP: 135/75   Pulse: 70   Resp: 18         General:  Middle aged female Awake, alert and oriented.  Appears to be not in any distress  Mucous Membranes:  Pink , anicteric  Neck: No JVD, no carotid bruit, no thyromegaly  Chest:  Clear to auscultation bilaterally, no added sounds  Cardiovascular:  RRR S1S2 heard, no murmurs or gallops  Abdomen:  Soft, undistended, non tender, no organomegaly, BS present  Extremities: Resolved edema,  Distal pulses well felt  Neurological :  kyphotic spine , stuporous gait noted  Non focal            MRI PELVIS         1. 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 2. Osteitis pubis with osseous erosion and stress edema of the pubic bones. 3. Bilateral mild SI arthropathy with osteoma of the iliac bones. 4. Spondylosis of L4-5 and L5-S1 with sterile endplate inflammation. 5. Mild bilateral hip arthrosis with spurring. MRI LS spine - from [facility]     There is lumbar dextroscoliosis which appears stable in comparison to prior study with no evidence of acute fracture or traumatic subluxation. Degenerative endplate marrow changes are seen surrounding the L2-3 disc level with no evidence to suggest metastatic disease. No abnormal signal is seen in the visualized spinal cord. At the L1-2 level there is minimal annular bulge with mild bilateral facet arthropathy. No significant canal narrowing is seen. No significant foraminal stenosis is seen bilaterally. At L2-3, there is broad-based annular bulge with bilateral facet arthropathy and ligamentous hypertrophy. There is a mild degree of resulting canal narrowing with mild narrowing of the left neural foramen with no right-sided foraminal stenosis. At L3-4, there is mild retrolisthesis with no significant disc bulge. There is bilateral facet arthropathy with mild to moderate degree of right-sided foraminal stenosis with moderate to severe narrowing of the left neural foramen. There is no significant canal stenosis seen at this level. At L4-5, there is a mild annular disc bulge with bilateral facet arthropathy. There is no significant canal stenosis. There is moderate to severe right-sided foraminal stenosis with mild narrowing of the left neural foramen.     6 at L5-S1,

## 2020-09-01 RX ORDER — LOSARTAN POTASSIUM 50 MG/1
TABLET ORAL
Qty: 90 TABLET | Refills: 0 | Status: SHIPPED | OUTPATIENT
Start: 2020-09-01 | End: 2020-11-13 | Stop reason: SDUPTHER

## 2020-09-01 RX ORDER — MONTELUKAST SODIUM 10 MG/1
TABLET ORAL
Qty: 90 TABLET | Refills: 0 | Status: SHIPPED | OUTPATIENT
Start: 2020-09-01 | End: 2020-09-09

## 2020-09-09 RX ORDER — MONTELUKAST SODIUM 10 MG/1
TABLET ORAL
Qty: 90 TABLET | Refills: 0 | Status: SHIPPED | OUTPATIENT
Start: 2020-09-09 | End: 2020-11-13 | Stop reason: SDUPTHER

## 2020-09-28 RX ORDER — TRIAMTERENE AND HYDROCHLOROTHIAZIDE 37.5; 25 MG/1; MG/1
TABLET ORAL
Qty: 90 TABLET | Refills: 0 | Status: SHIPPED | OUTPATIENT
Start: 2020-09-28 | End: 2020-11-02

## 2020-11-02 RX ORDER — TRIAMTERENE AND HYDROCHLOROTHIAZIDE 37.5; 25 MG/1; MG/1
TABLET ORAL
Qty: 90 TABLET | Refills: 0 | Status: SHIPPED | OUTPATIENT
Start: 2020-11-02 | End: 2021-01-28

## 2020-11-13 ENCOUNTER — OFFICE VISIT (OUTPATIENT)
Dept: INTERNAL MEDICINE CLINIC | Age: 60
End: 2020-11-13

## 2020-11-13 VITALS — WEIGHT: 148 LBS | BODY MASS INDEX: 24.66 KG/M2 | TEMPERATURE: 97.6 F | HEIGHT: 65 IN

## 2020-11-13 PROBLEM — K21.9 GASTROESOPHAGEAL REFLUX DISEASE WITHOUT ESOPHAGITIS: Status: ACTIVE | Noted: 2020-11-13

## 2020-11-13 PROCEDURE — 99213 OFFICE O/P EST LOW 20 MIN: CPT | Performed by: INTERNAL MEDICINE

## 2020-11-13 RX ORDER — FUROSEMIDE 20 MG/1
20 TABLET ORAL DAILY PRN
Qty: 30 TABLET | Refills: 0 | Status: SHIPPED | OUTPATIENT
Start: 2020-11-13 | End: 2021-07-26 | Stop reason: ALTCHOICE

## 2020-11-13 RX ORDER — MONTELUKAST SODIUM 10 MG/1
TABLET ORAL
Qty: 90 TABLET | Refills: 0 | Status: SHIPPED | OUTPATIENT
Start: 2020-11-13 | End: 2021-03-09

## 2020-11-13 RX ORDER — OMEPRAZOLE 20 MG/1
CAPSULE, DELAYED RELEASE ORAL
Qty: 90 CAPSULE | Refills: 0 | Status: SHIPPED | OUTPATIENT
Start: 2020-11-13 | End: 2021-03-12 | Stop reason: SDUPTHER

## 2020-11-13 RX ORDER — POTASSIUM CHLORIDE 750 MG/1
TABLET, FILM COATED, EXTENDED RELEASE ORAL
Qty: 180 TABLET | Refills: 0 | Status: SHIPPED | OUTPATIENT
Start: 2020-11-13 | End: 2021-03-09

## 2020-11-13 RX ORDER — LOSARTAN POTASSIUM 50 MG/1
TABLET ORAL
Qty: 90 TABLET | Refills: 0 | Status: SHIPPED | OUTPATIENT
Start: 2020-11-13 | End: 2021-03-09

## 2020-11-13 NOTE — PROGRESS NOTES
Subjective:      Patient ID: Renee Gitelman is a 64 y.o. female. HPI      64 y.o. female with h.o HTN,, MS on copaxone,   varicose veins, chronic pedal edema here for regular fw     Since last visit pt reports ongoing   low back pain , scoliosis, continues to be  in pain all the time. Seen pain mx Dr. Dashawn Hinson  Seen by spine surgery group, Dr. Barb Borja and was given spine brace but reports not being comfortable   Using vicodin now given by her neuro  No recent falls      GERD - no issues Complaint with prilosec    HTN - stable on meds      Leg edema resolved, no sob no changes in diet or activity   Not using lasix any more    Chronic depression - recently started on wellbutrin by her neuro but reports cymbalta worked better    Has h.o MS with mild cognitive impairment  - f/w Dr. Marquise Manuel at [address] [place] [address] .  Is on copoxone with good response  No recent flare ups, no recent MRI     Remote h.o transverse myelitis in cervical region       Current Outpatient Medications   Medication Sig Dispense Refill    triamterene-hydroCHLOROthiazide (MAXZIDE-25) 37.5-25 MG per tablet Take 1 tablet by mouth once daily 90 tablet 0    montelukast (SINGULAIR) 10 MG tablet Take 1 tablet by mouth in the evening 90 tablet 0    losartan (COZAAR) 50 MG tablet Take 1 tablet by mouth once daily 90 tablet 0    omeprazole (PRILOSEC) 20 MG delayed release capsule Take 1 capsule by mouth once daily 90 capsule 0    potassium chloride (KLOR-CON) 10 MEQ extended release tablet Take 1 tablet by mouth twice daily 180 tablet 0    furosemide (LASIX) 20 MG tablet Take 1 tablet by mouth daily as needed (swelling) 30 tablet 0    EPINEPHrine (EPIPEN 2-CONSTANZA) 0.3 MG/0.3ML SOAJ injection Use as directed for allergic reaction 1 each 2    DULoxetine (CYMBALTA) 20 MG extended release capsule Take 1 capsule by mouth once daily 90 capsule 0    cyclobenzaprine (FLEXERIL) 10 MG tablet TAKE 1 TABLET BY MOUTH TWICE DAILY AS NEEDED FOR MUSCLE SPASM 30 tablet 0  ciclopirox (PENLAC) 8 % solution APPLY  SOLUTION TOPICALLY TO AFFECTED AREA NIGHTLY 7 Bottle 0    gabapentin (NEURONTIN) 400 MG capsule Take 1 capsule by mouth 2 times daily 180 capsule 1    Glatiramer Acetate (COPAXONE SC) Inject  into the skin. No current facility-administered medications for this visit. Review of Systems   Constitutional: Negative for activity change, diaphoresis and unexpected weight change. HENT: Negative for congestion, ear discharge, hearing loss and trouble swallowing. Eyes: Negative for photophobia and visual disturbance. Respiratory: Negative for chest tightness and shortness of breath. Cardiovascular: Negative for chest pain, palpitations and leg swelling. Gastrointestinal: Negative for constipation and nausea. Endocrine: Negative for cold intolerance, heat intolerance and polyuria. Genitourinary: Negative for dysuria, flank pain and hematuria. Musculoskeletal: Positive for arthralgias and back pain. Negative for gait problem. Skin: Negative for color change. Allergic/Immunologic: Negative for immunocompromised state. Neurological: Positive for weakness. Negative for tremors, seizures and numbness. Psychiatric/Behavioral: Positive for depression  sleep disturbance. Negative for decreased concentration. The patient is not nervous/anxious. There are no changes to past medical history, family history, social history or review of systems(except as noted in the history section) since prior note (all reviewed with patient). Objective:   Physical Exam  Vitals:    11/13/20 0933   Temp: 97.6 °F (36.4 °C)         General:  Middle aged female Awake, alert and oriented.  Appears to be not in any distress  Mucous Membranes:  Pink , anicteric  Neck: No JVD, no carotid bruit, no thyromegaly  Chest:  Clear to auscultation bilaterally, no added sounds  Cardiovascular:  RRR S1S2 heard, no murmurs or gallops  Abdomen:  Soft, undistended, non tender, no organomegaly, BS present  Extremities: Resolved edema,  Distal pulses well felt  Neurological :  kyphotic spine , stuporous gait noted  Non focal        Wt Readings from Last 3 Encounters:   11/13/20 148 lb (67.1 kg)   06/12/20 148 lb (67.1 kg)   12/06/19 143 lb (64.9 kg)         MRI PELVIS         1. 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 2. Osteitis pubis with osseous erosion and stress edema of the pubic bones. 3. Bilateral mild SI arthropathy with osteoma of the iliac bones. 4. Spondylosis of L4-5 and L5-S1 with sterile endplate inflammation. 5. Mild bilateral hip arthrosis with spurring. MRI LS spine - from [facility]     There is lumbar dextroscoliosis which appears stable in comparison to prior study with no evidence of acute fracture or traumatic subluxation. Degenerative endplate marrow changes are seen surrounding the L2-3 disc level with no evidence to suggest metastatic disease. No abnormal signal is seen in the visualized spinal cord. At the L1-2 level there is minimal annular bulge with mild bilateral facet arthropathy. No significant canal narrowing is seen. No significant foraminal stenosis is seen bilaterally. At L2-3, there is broad-based annular bulge with bilateral facet arthropathy and ligamentous hypertrophy. There is a mild degree of resulting canal narrowing with mild narrowing of the left neural foramen with no right-sided foraminal stenosis. At L3-4, there is mild retrolisthesis with no significant disc bulge. There is bilateral facet arthropathy with mild to moderate degree of right-sided foraminal stenosis with moderate to severe narrowing of the left neural foramen. There is no significant canal stenosis seen at this level. At L4-5, there is a mild annular disc bulge with bilateral facet arthropathy. There is no significant canal stenosis.  There is moderate to severe right-sided foraminal stenosis with mild narrowing of the left neural foramen. 6 at L5-S1, there is mild annular disc bulge resulting in mild ventral thecal sac compression with no significant canal stenosis. There is bilateral facet arthropathy with moderate right-sided foraminal narrowing with minimal narrowing of the left neural foramen. There has been no significant progression seen in comparison to prior examination. Assessment:       Diagnosis Orders   1. Benign essential HTN     2. Multiple sclerosis ([address]     3. Central stenosis of spinal canal             Plan:          HTN - well controlled now with losartan and  maxzide . Not using lasix any more     Chronic arthritis -off nsaids for GERD issues     GERD - on ppi     MS - and with mild congnitive decline - maintained well on copaxone - f/w Dr. Juany Fregoso ( neurology )   On zonegran and provigil as well        Depression -off cymbalta, now on wellbutrin by neurology  Pain is the cause of her major depression       Low back pain with neuropathy of LE  - reports worsening pain issues  - Seen pain mx but did not continue  On vicodin, flexeril , Neurontin  per neurology prn  - might need brace with ongoing scoliosis/kyphosis   - home exercises recommended    Iron def anemia - resolved  -suspect PUD with Nsaid use.    Pt denied any black stools  - off nsaids and resolved anemia with iron supplements  - improved hb from 9 to 12  - recommend to stay off nsaids      Fracture of right inferior ischiopubic ramus /anterior column of right acetabulum- from falls      Seasonal allergies - stable on singulair    High risk for falls with kyphotic spine and vertebral compression fractures  - now using cane, advised quad cane    Refused flu vaccine   need colonoscopy  Need dexa and mammo

## 2021-01-28 RX ORDER — TRIAMTERENE AND HYDROCHLOROTHIAZIDE 37.5; 25 MG/1; MG/1
TABLET ORAL
Qty: 90 TABLET | Refills: 0 | Status: SHIPPED | OUTPATIENT
Start: 2021-01-28 | End: 2021-03-12 | Stop reason: SDUPTHER

## 2021-03-08 NOTE — TELEPHONE ENCOUNTER
----- Message from Sravani Daigle sent at 3/8/2021  2:09 PM EST -----  Contact: Pt- [phone]  Pt called requesting a refill on her losartan      Pt- [phone]    Pharmacy: [facility]    Future appt- 3/12/2021  Past appt- 11/13/2020

## 2021-03-09 RX ORDER — POTASSIUM CHLORIDE 750 MG/1
TABLET, FILM COATED, EXTENDED RELEASE ORAL
Qty: 180 TABLET | Refills: 0 | Status: SHIPPED | OUTPATIENT
Start: 2021-03-09 | End: 2021-06-14

## 2021-03-09 RX ORDER — LOSARTAN POTASSIUM 50 MG/1
TABLET ORAL
Qty: 90 TABLET | Refills: 0 | Status: SHIPPED | OUTPATIENT
Start: 2021-03-09 | End: 2021-07-26 | Stop reason: SDUPTHER

## 2021-03-09 RX ORDER — MONTELUKAST SODIUM 10 MG/1
TABLET ORAL
Qty: 90 TABLET | Refills: 0 | Status: SHIPPED | OUTPATIENT
Start: 2021-03-09 | End: 2021-06-14

## 2021-03-09 RX ORDER — LOSARTAN POTASSIUM 50 MG/1
TABLET ORAL
Qty: 90 TABLET | Refills: 0 | Status: SHIPPED | OUTPATIENT
Start: 2021-03-09 | End: 2021-03-09 | Stop reason: SDUPTHER

## 2021-03-12 ENCOUNTER — OFFICE VISIT (OUTPATIENT)
Dept: INTERNAL MEDICINE CLINIC | Age: 61
End: 2021-03-12

## 2021-03-12 VITALS
TEMPERATURE: 97.7 F | RESPIRATION RATE: 18 BRPM | BODY MASS INDEX: 23.82 KG/M2 | HEIGHT: 65 IN | SYSTOLIC BLOOD PRESSURE: 117 MMHG | WEIGHT: 143 LBS | DIASTOLIC BLOOD PRESSURE: 75 MMHG | HEART RATE: 70 BPM

## 2021-03-12 DIAGNOSIS — Z13.220 LIPID SCREENING: ICD-10-CM

## 2021-03-12 DIAGNOSIS — F32.9 REACTIVE DEPRESSION: ICD-10-CM

## 2021-03-12 DIAGNOSIS — I10 BENIGN ESSENTIAL HTN: Primary | ICD-10-CM

## 2021-03-12 DIAGNOSIS — G35 MULTIPLE SCLEROSIS (HCC): ICD-10-CM

## 2021-03-12 DIAGNOSIS — M51.36 DDD (DEGENERATIVE DISC DISEASE), LUMBAR: ICD-10-CM

## 2021-03-12 DIAGNOSIS — K21.9 GASTROESOPHAGEAL REFLUX DISEASE WITHOUT ESOPHAGITIS: ICD-10-CM

## 2021-03-12 DIAGNOSIS — I10 BENIGN ESSENTIAL HTN: ICD-10-CM

## 2021-03-12 LAB
A/G RATIO: 1.6 (ref 1.1–2.2)
ALBUMIN SERPL-MCNC: 4.4 G/DL (ref 3.4–5)
ALP BLD-CCNC: 106 U/L (ref 40–129)
ALT SERPL-CCNC: 14 U/L (ref 10–40)
ANION GAP SERPL CALCULATED.3IONS-SCNC: 8 MMOL/L (ref 3–16)
AST SERPL-CCNC: 23 U/L (ref 15–37)
BASOPHILS ABSOLUTE: 0 K/UL (ref 0–0.2)
BASOPHILS RELATIVE PERCENT: 0.6 %
BILIRUB SERPL-MCNC: <0.2 MG/DL (ref 0–1)
BUN BLDV-MCNC: 18 MG/DL (ref 7–20)
CALCIUM SERPL-MCNC: 9.3 MG/DL (ref 8.3–10.6)
CHLORIDE BLD-SCNC: 102 MMOL/L (ref 99–110)
CHOLESTEROL, FASTING: 183 MG/DL (ref 0–199)
CO2: 29 MMOL/L (ref 21–32)
CREAT SERPL-MCNC: 0.7 MG/DL (ref 0.6–1.1)
EOSINOPHILS ABSOLUTE: 0.2 K/UL (ref 0–0.6)
EOSINOPHILS RELATIVE PERCENT: 3.7 %
GFR AFRICAN AMERICAN: >60
GFR NON-AFRICAN AMERICAN: >60
GLOBULIN: 2.8 G/DL
GLUCOSE BLD-MCNC: 85 MG/DL (ref 70–99)
HCT VFR BLD CALC: 39 % (ref 36–48)
HDLC SERPL-MCNC: 53 MG/DL (ref 40–60)
HEMOGLOBIN: 12.8 G/DL (ref 12–16)
LDL CHOLESTEROL CALCULATED: 116 MG/DL
LYMPHOCYTES ABSOLUTE: 1 K/UL (ref 1–5.1)
LYMPHOCYTES RELATIVE PERCENT: 23.4 %
MCH RBC QN AUTO: 30.9 PG (ref 26–34)
MCHC RBC AUTO-ENTMCNC: 32.8 G/DL (ref 31–36)
MCV RBC AUTO: 94.2 FL (ref 80–100)
MONOCYTES ABSOLUTE: 0.4 K/UL (ref 0–1.3)
MONOCYTES RELATIVE PERCENT: 9.1 %
NEUTROPHILS ABSOLUTE: 2.6 K/UL (ref 1.7–7.7)
NEUTROPHILS RELATIVE PERCENT: 63.2 %
PDW BLD-RTO: 13 % (ref 12.4–15.4)
PLATELET # BLD: 256 K/UL (ref 135–450)
PMV BLD AUTO: 9.7 FL (ref 5–10.5)
POTASSIUM SERPL-SCNC: 4.2 MMOL/L (ref 3.5–5.1)
RBC # BLD: 4.14 M/UL (ref 4–5.2)
SODIUM BLD-SCNC: 139 MMOL/L (ref 136–145)
TOTAL PROTEIN: 7.2 G/DL (ref 6.4–8.2)
TRIGLYCERIDE, FASTING: 71 MG/DL (ref 0–150)
VLDLC SERPL CALC-MCNC: 14 MG/DL
WBC # BLD: 4.1 K/UL (ref 4–11)

## 2021-03-12 PROCEDURE — 99212 OFFICE O/P EST SF 10 MIN: CPT | Performed by: INTERNAL MEDICINE

## 2021-03-12 RX ORDER — TRIAMTERENE AND HYDROCHLOROTHIAZIDE 37.5; 25 MG/1; MG/1
1 TABLET ORAL DAILY
Qty: 90 TABLET | Refills: 1 | Status: SHIPPED | OUTPATIENT
Start: 2021-03-12 | End: 2021-11-18

## 2021-03-12 RX ORDER — OMEPRAZOLE 20 MG/1
CAPSULE, DELAYED RELEASE ORAL
Qty: 90 CAPSULE | Refills: 0 | Status: SHIPPED | OUTPATIENT
Start: 2021-03-12 | End: 2021-04-02

## 2021-03-12 NOTE — PROGRESS NOTES
Subjective:      Patient ID: Christian Byrne is a 64 y.o. female. HPI      64 y.o. female with h.o HTN,, MS on copaxone,   varicose veins, chronic pedal edema here for regular fw     Since last visit pt reports ongoing   low back pain , scoliosis, continues to be  in pain all the time. Seen by spine surgery group, Dr. Anju Grace and was given spine brace but reports not being comfortable   Using vicodin now given by her neurology  No recent falls      Has h.o MS with mild cognitive impairment  - f/w Dr. Gerhardt Pennant at [address] [place] [address] .  Is on copoxone with good response but recently off this med for unclear reasons   Pt wishes not to take this med any more   No recent flare ups, no recent MRI       GERD - no issues Complaint with prilosec    HTN - stable on meds      Leg edema resolved, no sob no changes in diet or activity   Not using lasix any more    Chronic depression - recently started on wellbutrin by her neuro but reports cymbalta worked better      Remote h.o transverse myelitis in cervical region     Lives with  and      Current Outpatient Medications   Medication Sig Dispense Refill    potassium chloride (KLOR-CON) 10 MEQ extended release tablet Take 1 tablet by mouth twice daily 180 tablet 0    montelukast (SINGULAIR) 10 MG tablet Take 1 tablet by mouth in the evening 90 tablet 0    losartan (COZAAR) 50 MG tablet Take 1 tablet by mouth once daily 90 tablet 0    triamterene-hydroCHLOROthiazide (MAXZIDE-25) 37.5-25 MG per tablet Take 1 tablet by mouth once daily 90 tablet 0    omeprazole (PRILOSEC) 20 MG delayed release capsule Take 1 capsule by mouth once daily 90 capsule 0    furosemide (LASIX) 20 MG tablet Take 1 tablet by mouth daily as needed (swelling) 30 tablet 0    EPINEPHrine (EPIPEN 2-CONSTANZA) 0.3 MG/0.3ML SOAJ injection Use as directed for allergic reaction 1 each 2    DULoxetine (CYMBALTA) 20 MG extended release capsule Take 1 capsule by mouth once daily 90 capsule 0    cyclobenzaprine (FLEXERIL) 10 MG tablet TAKE 1 TABLET BY MOUTH TWICE DAILY AS NEEDED FOR MUSCLE SPASM 30 tablet 0    ciclopirox (PENLAC) 8 % solution APPLY  SOLUTION TOPICALLY TO AFFECTED AREA NIGHTLY 7 Bottle 0    gabapentin (NEURONTIN) 400 MG capsule Take 1 capsule by mouth 2 times daily 180 capsule 1    Glatiramer Acetate (COPAXONE SC) Inject  into the skin. No current facility-administered medications for this visit. Review of Systems   Constitutional: Negative for activity change, diaphoresis and unexpected weight change. HENT: Negative for congestion, ear discharge, hearing loss and trouble swallowing. Eyes: Negative for photophobia and visual disturbance. Respiratory: Negative for chest tightness and shortness of breath. Cardiovascular: Negative for chest pain, palpitations and leg swelling. Gastrointestinal: Negative for constipation and nausea. Endocrine: Negative for cold intolerance, heat intolerance and polyuria. Genitourinary: Negative for dysuria, flank pain and hematuria. Musculoskeletal: Positive for arthralgias and back pain. Negative for gait problem. Skin: Negative for color change. Allergic/Immunologic: Negative for immunocompromised state. Neurological: Positive for weakness. Negative for tremors, seizures and numbness. Psychiatric/Behavioral: Positive for depression  sleep disturbance. Negative for decreased concentration. The patient is not nervous/anxious. There are no changes to past medical history, family history, social history or review of systems(except as noted in the history section) since prior note (all reviewed with patient). Objective:   Physical Exam  Vitals:    03/12/21 1023   BP: 117/75   Pulse: 70   Resp: 18   Temp: 97.7 °F (36.5 °C)         General:  Middle aged female Awake, alert and oriented.  Appears to be not in any distress  Mucous Membranes:  Pink , anicteric  Neck: No JVD, no carotid bruit, no thyromegaly  Chest: Clear to auscultation bilaterally, no added sounds  Cardiovascular:  RRR S1S2 heard, no murmurs or gallops  Abdomen:  Soft, undistended, non tender, no organomegaly, BS present  Extremities: Resolved edema,  Distal pulses well felt  Neurological :  kyphotic spine , stuporous gait noted  Using cane   Non focal        Wt Readings from Last 3 Encounters:   03/12/21 143 lb (64.9 kg)   11/13/20 148 lb (67.1 kg)   06/12/20 148 lb (67.1 kg)         MRI PELVIS         1. 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 2. Osteitis pubis with osseous erosion and stress edema of the pubic bones. 3. Bilateral mild SI arthropathy with osteoma of the iliac bones. 4. Spondylosis of L4-5 and L5-S1 with sterile endplate inflammation. 5. Mild bilateral hip arthrosis with spurring. MRI LS spine - from [facility]     There is lumbar dextroscoliosis which appears stable in comparison to prior study with no evidence of acute fracture or traumatic subluxation. Degenerative endplate marrow changes are seen surrounding the L2-3 disc level with no evidence to suggest metastatic disease. No abnormal signal is seen in the visualized spinal cord. At the L1-2 level there is minimal annular bulge with mild bilateral facet arthropathy. No significant canal narrowing is seen. No significant foraminal stenosis is seen bilaterally. At L2-3, there is broad-based annular bulge with bilateral facet arthropathy and ligamentous hypertrophy. There is a mild degree of resulting canal narrowing with mild narrowing of the left neural foramen with no right-sided foraminal stenosis. At L3-4, there is mild retrolisthesis with no significant disc bulge. There is bilateral facet arthropathy with mild to moderate degree of right-sided foraminal stenosis with moderate to severe narrowing of the left neural foramen. There is no significant canal stenosis seen at this level.     At L4-5, there is a mild annular disc bulge with bilateral facet arthropathy. There is no significant canal stenosis. There is moderate to severe right-sided foraminal stenosis with mild narrowing of the left neural foramen. 6 at L5-S1, there is mild annular disc bulge resulting in mild ventral thecal sac compression with no significant canal stenosis. There is bilateral facet arthropathy with moderate right-sided foraminal narrowing with minimal narrowing of the left neural foramen. There has been no significant progression seen in comparison to prior examination. Assessment:       Diagnosis Orders   1. Benign essential HTN  CBC WITH AUTO DIFFERENTIAL    COMPREHENSIVE METABOLIC PANEL   2. Multiple sclerosis (HCC)  CBC WITH AUTO DIFFERENTIAL    COMPREHENSIVE METABOLIC PANEL   3. DDD (degenerative disc disease), lumbar     4. Reactive depression     5. Gastroesophageal reflux disease without esophagitis     6. Lipid screening  Lipid, Fasting           Plan:          HTN - well controlled now with losartan and  maxzide . Not using lasix any more     Chronic arthritis -off nsaids for GERD issues     GERD - on ppi     MS - and with mild congnitive decline - maintained well on copaxone - f/w Dr. Kevin Mcallister ( neurology )   On zonegran and provigil as well        Depression -off cymbalta, now on wellbutrin by neurology  Pain is the cause of her major depression       Low back pain with neuropathy of LE  - reports worsening pain issues  - Seen pain mx but did not continue  On vicodin, flexeril , Neurontin  per neurology prn  - might need brace with ongoing scoliosis/kyphosis   - home exercises recommended    Iron def anemia - resolved  -suspect PUD with Nsaid use.    Pt denied any black stools  - off nsaids and resolved anemia with iron supplements  - improved hb from 9 to 12  - recommend to stay off nsaids      Fracture of right inferior ischiopubic ramus /anterior column of right acetabulum- from falls      Seasonal allergies - stable on singulair    High risk for falls with kyphotic spine and vertebral compression fractures  - now using cane, advised quad cane    Refused flu vaccine   need colonoscopy  Recommend covid vaccine , pt refused  Need dexa and mammo

## 2021-04-02 RX ORDER — OMEPRAZOLE 20 MG/1
CAPSULE, DELAYED RELEASE ORAL
Qty: 90 CAPSULE | Refills: 0 | Status: SHIPPED | OUTPATIENT
Start: 2021-04-02 | End: 2021-07-19

## 2021-06-07 ENCOUNTER — TELEPHONE (OUTPATIENT)
Dept: INTERNAL MEDICINE CLINIC | Age: 61
End: 2021-06-07

## 2021-06-07 NOTE — TELEPHONE ENCOUNTER
----- Message from Shonna Reynoso MD sent at 6/7/2021  1:39 PM EDT -----  Contact: Raiford Goldmann [phone]  Noted  ----- Message -----  From: Joe Avery  Sent: 6/7/2021  10:43 AM EDT  To: Shonna Reynoso MD    [place] [id number]. [facility] states Pt was in hospital recently.  ----- Message -----  From: Shonna Reynoso MD  Sent: 6/6/2021   8:24 AM EDT  To: Joe Avery    Was she in the hospital recently ? Ok to do  ----- Message -----  From: Kody Griffith MA  Sent: 6/4/2021   9:47 AM EDT  To: MD Ioana Garcia from [facility] called requesting orders for PT skilled nursing care for patient.

## 2021-06-07 NOTE — TELEPHONE ENCOUNTER
----- Message from Rafaela Kehr sent at 6/7/2021 10:43 AM EDT -----  Contact: Ellyn Holt [phone]  Pt refused services from [place] [address]. [facility] states Pt was in hospital recently.  ----- Message -----  From: Philip Morley MD  Sent: 6/6/2021   8:24 AM EDT  To: Rafaela Kehr    Was she in the hospital recently ? Ok to do  ----- Message -----  From: Eleanor Alexandre MA  Sent: 6/4/2021   9:47 AM EDT  To: MD Maricruz Boldenah from [facility] called requesting orders for PT skilled nursing care for patient.

## 2021-06-14 RX ORDER — MONTELUKAST SODIUM 10 MG/1
TABLET ORAL
Qty: 90 TABLET | Refills: 0 | Status: SHIPPED | OUTPATIENT
Start: 2021-06-14 | End: 2021-09-23

## 2021-06-14 RX ORDER — POTASSIUM CHLORIDE 750 MG/1
TABLET, FILM COATED, EXTENDED RELEASE ORAL
Qty: 180 TABLET | Refills: 0 | Status: SHIPPED | OUTPATIENT
Start: 2021-06-14 | End: 2021-09-23

## 2021-07-19 RX ORDER — EPINEPHRINE 0.3 MG/.3ML
INJECTION SUBCUTANEOUS
Qty: 2 EACH | Refills: 0 | Status: SHIPPED | OUTPATIENT
Start: 2021-07-19 | End: 2021-11-24

## 2021-07-19 RX ORDER — OMEPRAZOLE 20 MG/1
CAPSULE, DELAYED RELEASE ORAL
Qty: 90 CAPSULE | Refills: 0 | Status: SHIPPED | OUTPATIENT
Start: 2021-07-19 | End: 2021-10-21

## 2021-07-26 ENCOUNTER — OFFICE VISIT (OUTPATIENT)
Dept: INTERNAL MEDICINE CLINIC | Age: 61
End: 2021-07-26

## 2021-07-26 VITALS
HEIGHT: 65 IN | WEIGHT: 136 LBS | RESPIRATION RATE: 18 BRPM | DIASTOLIC BLOOD PRESSURE: 75 MMHG | SYSTOLIC BLOOD PRESSURE: 125 MMHG | BODY MASS INDEX: 22.66 KG/M2 | HEART RATE: 70 BPM

## 2021-07-26 DIAGNOSIS — Z00.00 ANNUAL PHYSICAL EXAM: ICD-10-CM

## 2021-07-26 DIAGNOSIS — D64.9 POSTOPERATIVE ANEMIA: ICD-10-CM

## 2021-07-26 DIAGNOSIS — I10 BENIGN ESSENTIAL HTN: ICD-10-CM

## 2021-07-26 DIAGNOSIS — M51.36 DDD (DEGENERATIVE DISC DISEASE), LUMBAR: ICD-10-CM

## 2021-07-26 DIAGNOSIS — G35 MULTIPLE SCLEROSIS (HCC): ICD-10-CM

## 2021-07-26 DIAGNOSIS — Z00.00 ANNUAL PHYSICAL EXAM: Primary | ICD-10-CM

## 2021-07-26 DIAGNOSIS — F33.41 RECURRENT MAJOR DEPRESSIVE DISORDER, IN PARTIAL REMISSION (HCC): ICD-10-CM

## 2021-07-26 LAB
BASOPHILS ABSOLUTE: 0 K/UL (ref 0–0.2)
BASOPHILS RELATIVE PERCENT: 0.6 %
CHOLESTEROL, FASTING: 216 MG/DL (ref 0–199)
EOSINOPHILS ABSOLUTE: 0.2 K/UL (ref 0–0.6)
EOSINOPHILS RELATIVE PERCENT: 3.4 %
HCT VFR BLD CALC: 37.4 % (ref 36–48)
HDLC SERPL-MCNC: 57 MG/DL (ref 40–60)
HEMOGLOBIN: 12.2 G/DL (ref 12–16)
LDL CHOLESTEROL CALCULATED: 141 MG/DL
LYMPHOCYTES ABSOLUTE: 1 K/UL (ref 1–5.1)
LYMPHOCYTES RELATIVE PERCENT: 17.5 %
MCH RBC QN AUTO: 30.8 PG (ref 26–34)
MCHC RBC AUTO-ENTMCNC: 32.6 G/DL (ref 31–36)
MCV RBC AUTO: 94.6 FL (ref 80–100)
MONOCYTES ABSOLUTE: 0.4 K/UL (ref 0–1.3)
MONOCYTES RELATIVE PERCENT: 7.4 %
NEUTROPHILS ABSOLUTE: 3.9 K/UL (ref 1.7–7.7)
NEUTROPHILS RELATIVE PERCENT: 71.1 %
PDW BLD-RTO: 13.9 % (ref 12.4–15.4)
PLATELET # BLD: 253 K/UL (ref 135–450)
PMV BLD AUTO: 8.9 FL (ref 5–10.5)
RBC # BLD: 3.95 M/UL (ref 4–5.2)
TRIGLYCERIDE, FASTING: 91 MG/DL (ref 0–150)
VLDLC SERPL CALC-MCNC: 18 MG/DL
WBC # BLD: 5.5 K/UL (ref 4–11)

## 2021-07-26 PROCEDURE — 99396 PREV VISIT EST AGE 40-64: CPT | Performed by: INTERNAL MEDICINE

## 2021-07-26 RX ORDER — LOSARTAN POTASSIUM 50 MG/1
TABLET ORAL
Qty: 90 TABLET | Refills: 0 | Status: SHIPPED | OUTPATIENT
Start: 2021-07-26 | End: 2021-09-23

## 2021-07-26 RX ORDER — BUPROPION HYDROCHLORIDE 300 MG/1
300 TABLET ORAL DAILY
COMMUNITY
Start: 2021-05-14

## 2021-07-26 RX ORDER — MODAFINIL 200 MG/1
300 TABLET ORAL DAILY
COMMUNITY
Start: 2021-05-17

## 2021-07-26 RX ORDER — B-COMPLEX WITH VITAMIN C
1 TABLET ORAL DAILY
COMMUNITY

## 2021-07-26 RX ORDER — ELETRIPTAN HYDROBROMIDE 40 MG/1
40 TABLET, FILM COATED ORAL 2 TIMES DAILY PRN
COMMUNITY
Start: 2021-04-07

## 2021-07-26 NOTE — PROGRESS NOTES
Subjective:      Patient ID: Shannan Meléndez is a 63 y.o. female. HPI      63 y.o. female with h.o HTN,, MS on copaxone,   varicose veins, chronic pedal edema here for annual exam     Since last visit pt reports a fall on right hip leading to fracture, admitted to [facility] and had right she arthroplasty done and completed rehab    Off all pain meds. Doing better with crutches, refused outpt therapy, now ambulating well     ongoing   low back pain , scoliosis, continues to be  in pain all the time. Seen by spine surgery group, Dr. Ana Cabral and was given spine brace but reports not being comfortable   Using vicodin now given by her neurology  No recent falls      Has h.o MS with mild cognitive impairment  - f/w Dr. Lea Higuera at [address] [place] [address] [address] .  Is on copoxone with good response but recently off this med for unclear reasons   Pt wishes not to take this med any more   No recent flare ups, no recent MRI       GERD - no issues Complaint with prilosec- constipation issues from hospital resolved      HTN - stable on meds      Leg edema resolved, no sob no changes in diet or activity   Not using lasix any more    Chronic depression -on wellbutrin by her neuro       Remote h.o transverse myelitis in cervical region     Lives with  and      Non smoker     Allergies   Allergen Reactions    Flagyl [Metronidazole]     Sulfa Antibiotics      Past Medical History:   Diagnosis Date    MS (congenital mitral stenosis)     MS (multiple sclerosis) ([facility] [address]      Past Surgical History:   Procedure Laterality Date     SECTION      CHOLECYSTECTOMY       Social History     Socioeconomic History    Marital status:      Spouse name: Not on file    Number of children: Not on file    Years of education: Not on file    Highest education level: Not on file   Occupational History    Not on file   Tobacco Use    Smoking status: Never Smoker    Smokeless tobacco: Never Used   Substance and Sexual Activity  Alcohol use: No    Drug use: No    Sexual activity: Not on file   Other Topics Concern    Not on file   Social History Narrative    Not on file     Social Determinants of Health     Financial Resource Strain:     Difficulty of Paying Living Expenses:    Food Insecurity:     Worried About Running Out of Food in the Last Year:     [address] in the Last Year:    Transportation Needs:     Lack of Transportation (Medical):  Lack of Transportation (Non-Medical):    Physical Activity:     Days of Exercise per Week:     Minutes of Exercise per Session:    Stress:     Feeling of Stress :    Social Connections:     Frequency of Communication with Friends and Family:     Frequency of Social Gatherings with Friends and Family:     Attends Rastafari Services:     Active Member of Clubs or Organizations:     Attends Club or Organization Meetings:     Marital Status:    Intimate Partner Violence:     Fear of Current or Ex-Partner:     Emotionally Abused:     Physically Abused:     Sexually Abused:      No family history on file.       Current Outpatient Medications   Medication Sig Dispense Refill    omeprazole (PRILOSEC) 20 MG delayed release capsule Take 1 capsule by mouth once daily 90 capsule 0    EPINEPHrine (EPIPEN) 0.3 MG/0.3ML SOAJ injection INJECT CONTENTS OF 1 PEN AS NEEDED FOR ALLERGIC REACTION 2 each 0    potassium chloride (KLOR-CON) 10 MEQ extended release tablet Take 1 tablet by mouth twice daily 180 tablet 0    montelukast (SINGULAIR) 10 MG tablet Take 1 tablet by mouth in the evening 90 tablet 0    triamterene-hydroCHLOROthiazide (MAXZIDE-25) 37.5-25 MG per tablet Take 1 tablet by mouth daily 90 tablet 1    losartan (COZAAR) 50 MG tablet Take 1 tablet by mouth once daily 90 tablet 0    furosemide (LASIX) 20 MG tablet Take 1 tablet by mouth daily as needed (swelling) 30 tablet 0    DULoxetine (CYMBALTA) 20 MG extended release capsule Take 1 capsule by mouth once daily 90 capsule 0    cyclobenzaprine (FLEXERIL) 10 MG tablet TAKE 1 TABLET BY MOUTH TWICE DAILY AS NEEDED FOR MUSCLE SPASM 30 tablet 0    ciclopirox (PENLAC) 8 % solution APPLY  SOLUTION TOPICALLY TO AFFECTED AREA NIGHTLY 7 Bottle 0    gabapentin (NEURONTIN) 400 MG capsule Take 1 capsule by mouth 2 times daily 180 capsule 1    Glatiramer Acetate (COPAXONE SC) Inject  into the skin. No current facility-administered medications for this visit. Review of Systems   Constitutional: Negative for activity change, diaphoresis and unexpected weight change. HENT: Negative for congestion, ear discharge, hearing loss and trouble swallowing. Eyes: Negative for photophobia and visual disturbance. Respiratory: Negative for chest tightness and shortness of breath. Cardiovascular: Negative for chest pain, palpitations and leg swelling. Gastrointestinal: Negative for constipation and nausea. Endocrine: Negative for cold intolerance, heat intolerance and polyuria. Genitourinary: Negative for dysuria, flank pain and hematuria. Musculoskeletal: Positive for arthralgias and back pain. Negative for gait problem. Skin: Negative for color change. Allergic/Immunologic: Negative for immunocompromised state. Neurological: Positive for weakness. Negative for tremors, seizures and numbness. Psychiatric/Behavioral: Positive for depression  sleep disturbance. Negative for decreased concentration. The patient is not nervous/anxious. There are no changes to past medical history, family history, social history or review of systems(except as noted in the history section) since prior note (all reviewed with patient). Objective:   Physical Exam  Vitals:    07/26/21 1022   BP: 125/75   Pulse: 70   Resp: 18         General:  Middle aged female Awake, alert and oriented.  Appears to be not in any distress  Mucous Membranes:  Pink , anicteric  Neck: No JVD, no carotid bruit, no thyromegaly  Chest:  Clear to auscultation bilaterally, no added sounds  Cardiovascular:  RRR S1S2 heard, no murmurs or gallops  Abdomen:  Soft, undistended, non tender, no organomegaly, BS present  Extremities: Resolved edema,  Distal pulses well felt  Neurological :  kyphotic spine , stuporous gait noted  Using crutches  Non focal        Wt Readings from Last 3 Encounters:   07/26/21 136 lb (61.7 kg)   03/12/21 143 lb (64.9 kg)   11/13/20 148 lb (67.1 kg)         MRI PELVIS         1. 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 2. Osteitis pubis with osseous erosion and stress edema of the pubic bones. 3. Bilateral mild SI arthropathy with osteoma of the iliac bones. 4. Spondylosis of L4-5 and L5-S1 with sterile endplate inflammation. 5. Mild bilateral hip arthrosis with spurring. MRI LS spine - [facility]     There is lumbar dextroscoliosis which appears stable in comparison to prior study with no evidence of acute fracture or traumatic subluxation. Degenerative endplate marrow changes are seen surrounding the L2-3 disc level with no evidence to suggest metastatic disease. No abnormal signal is seen in the visualized spinal cord. At the L1-2 level there is minimal annular bulge with mild bilateral facet arthropathy. No significant canal narrowing is seen. No significant foraminal stenosis is seen bilaterally. At L2-3, there is broad-based annular bulge with bilateral facet arthropathy and ligamentous hypertrophy. There is a mild degree of resulting canal narrowing with mild narrowing of the left neural foramen with no right-sided foraminal stenosis. At L3-4, there is mild retrolisthesis with no significant disc bulge. There is bilateral facet arthropathy with mild to moderate degree of right-sided foraminal stenosis with moderate to severe narrowing of the left neural foramen. There is no significant canal stenosis seen at this level.     At L4-5, there is a mild annular disc bulge with bilateral facet arthropathy. There is no significant canal stenosis. There is moderate to severe right-sided foraminal stenosis with mild narrowing of the left neural foramen. 6 at L5-S1, there is mild annular disc bulge resulting in mild ventral thecal sac compression with no significant canal stenosis. There is bilateral facet arthropathy with moderate right-sided foraminal narrowing with minimal narrowing of the left neural foramen. There has been no significant progression seen in comparison to prior examination. Ct chest 5/21      Nodule described on recent chest x-ray is a calcified granuloma in the right lower lobe of no clinical significance     There is a 3 mm nodule identified in the right lower lobe laterally. Guidelines recommend that patients without known primary cancer or immunosuppression with solid nodule less than 6 mm in average diameter do not require routine follow up.  Patients with known primary cancer should receive three month follow-up CT.     Certain high risk patients with suspicious nodule morphology or upper lobe location may warrant a 12 month follow up CT.     70% compression fracture of T12 age indeterminate. Xray hip  5/21      Minimally displaced subcapital/transcervical right femoral neck fracture without evidence of dislocation. Minimal bilateral hip osteoarthritis.        Assessment:       Diagnosis Orders   1. Benign essential HTN     2. Multiple sclerosis ([address]     3. DDD (degenerative disc disease), lumbar     4. Recurrent major depressive disorder, in partial remission ([address]             Plan:          HTN - well controlled now with losartan and  maxzide .    Not using lasix any more     Chronic arthritis -off nsaids for GERD issues     GERD - on ppi     MS - and with mild congnitive decline - maintained well on copaxone - f/w Dr. Keyona Seay ( neurology )   On zonegran and provigil as well        Major depressive disorder    -off cymbalta, now on wellbutrin 300 mg  by neurology  - stable       Low back pain with neuropathy of LE  - reports worsening pain issues  - Seen pain mx but did not continue  On vicodin, flexeril , Neurontin  per neurology prn  - might need brace with ongoing scoliosis/kyphosis   - home exercises recommended    Iron def anemia - resolved  Worsened again with recent surgery  Check cbc    Seasonal allergies - stable on singulair      Recurrent falls with hx of MS    Fracture of right inferior ischiopubic ramus /anterior column of right acetabulum- from recurrent falls 2019    High risk for falls with kyphotic spine and vertebral compression fractures  2020    S,p recent fall with right hip fracture s/p right hemiarthroplasty 5/21      Refused flu vaccine   need colonoscopy  Had  covid vaccine  Need dexa and mammo - recommend again

## 2021-07-29 ENCOUNTER — TELEPHONE (OUTPATIENT)
Dept: INTERNAL MEDICINE CLINIC | Age: 61
End: 2021-07-29

## 2021-07-29 NOTE — TELEPHONE ENCOUNTER
----- Message from Rox Peacock MD sent at 7/29/2021  4:35 PM EDT -----  Contact: [phone] (h) [address] [facility] Marijuana Doctor / [address] [place] [address]  Address: [facility]  Phone: [phone]    ----- Message -----  From: Tom Shows  Sent: 7/26/2021  12:36 PM EDT  To: Rox Peacock MD    Pt called and was wondering if you could give her the name and number of the \"marijuana\" doctor that you recommended to her the last time she was here.     Thank you

## 2021-09-23 RX ORDER — MONTELUKAST SODIUM 10 MG/1
TABLET ORAL
Qty: 90 TABLET | Refills: 0 | Status: SHIPPED | OUTPATIENT
Start: 2021-09-23 | End: 2021-12-15

## 2021-09-23 RX ORDER — POTASSIUM CHLORIDE 750 MG/1
TABLET, FILM COATED, EXTENDED RELEASE ORAL
Qty: 180 TABLET | Refills: 0 | Status: SHIPPED | OUTPATIENT
Start: 2021-09-23 | End: 2021-12-01 | Stop reason: SDUPTHER

## 2021-09-23 RX ORDER — LOSARTAN POTASSIUM 50 MG/1
TABLET ORAL
Qty: 90 TABLET | Refills: 0 | Status: SHIPPED | OUTPATIENT
Start: 2021-09-23 | End: 2021-12-15

## 2021-10-21 RX ORDER — OMEPRAZOLE 20 MG/1
CAPSULE, DELAYED RELEASE ORAL
Qty: 90 CAPSULE | Refills: 0 | Status: SHIPPED | OUTPATIENT
Start: 2021-10-21 | End: 2021-12-17 | Stop reason: SDUPTHER

## 2021-11-18 RX ORDER — TRIAMTERENE AND HYDROCHLOROTHIAZIDE 37.5; 25 MG/1; MG/1
TABLET ORAL
Qty: 90 TABLET | Refills: 0 | Status: SHIPPED | OUTPATIENT
Start: 2021-11-18 | End: 2022-02-17

## 2021-11-24 RX ORDER — EPINEPHRINE 0.3 MG/.3ML
INJECTION SUBCUTANEOUS
Qty: 2 EACH | Refills: 0 | Status: SHIPPED | OUTPATIENT
Start: 2021-11-24

## 2021-12-01 RX ORDER — POTASSIUM CHLORIDE 750 MG/1
TABLET, FILM COATED, EXTENDED RELEASE ORAL
Qty: 180 TABLET | Refills: 1 | Status: SHIPPED | OUTPATIENT
Start: 2021-12-01 | End: 2021-12-17 | Stop reason: SDUPTHER

## 2021-12-15 RX ORDER — LOSARTAN POTASSIUM 50 MG/1
TABLET ORAL
Qty: 30 TABLET | Refills: 0 | Status: SHIPPED | OUTPATIENT
Start: 2021-12-15 | End: 2021-12-17 | Stop reason: SDUPTHER

## 2021-12-15 RX ORDER — MONTELUKAST SODIUM 10 MG/1
TABLET ORAL
Qty: 30 TABLET | Refills: 0 | Status: SHIPPED | OUTPATIENT
Start: 2021-12-15 | End: 2021-12-17 | Stop reason: SDUPTHER

## 2021-12-17 ENCOUNTER — OFFICE VISIT (OUTPATIENT)
Dept: INTERNAL MEDICINE CLINIC | Age: 61
End: 2021-12-17

## 2021-12-17 ENCOUNTER — HOSPITAL ENCOUNTER (OUTPATIENT)
Dept: GENERAL RADIOLOGY | Age: 61
Discharge: HOME | End: 2021-12-17
Payer: COMMERCIAL

## 2021-12-17 ENCOUNTER — TELEPHONE (OUTPATIENT)
Dept: INTERNAL MEDICINE CLINIC | Age: 61
End: 2021-12-17

## 2021-12-17 VITALS
BODY MASS INDEX: 23.32 KG/M2 | SYSTOLIC BLOOD PRESSURE: 125 MMHG | DIASTOLIC BLOOD PRESSURE: 75 MMHG | HEART RATE: 70 BPM | WEIGHT: 140 LBS | HEIGHT: 65 IN | RESPIRATION RATE: 18 BRPM

## 2021-12-17 DIAGNOSIS — I10 BENIGN ESSENTIAL HTN: Primary | ICD-10-CM

## 2021-12-17 DIAGNOSIS — Z78.0 POST-MENOPAUSAL: ICD-10-CM

## 2021-12-17 DIAGNOSIS — F33.41 RECURRENT MAJOR DEPRESSIVE DISORDER, IN PARTIAL REMISSION (HCC): ICD-10-CM

## 2021-12-17 DIAGNOSIS — G35 MULTIPLE SCLEROSIS (HCC): ICD-10-CM

## 2021-12-17 DIAGNOSIS — M51.36 DDD (DEGENERATIVE DISC DISEASE), LUMBAR: ICD-10-CM

## 2021-12-17 DIAGNOSIS — I10 BENIGN ESSENTIAL HTN: ICD-10-CM

## 2021-12-17 LAB
A/G RATIO: 2 (ref 1.1–2.2)
ALBUMIN SERPL-MCNC: 4.5 G/DL (ref 3.4–5)
ALP BLD-CCNC: 148 U/L (ref 40–129)
ALT SERPL-CCNC: 18 U/L (ref 10–40)
ANION GAP SERPL CALCULATED.3IONS-SCNC: 10 MMOL/L (ref 3–16)
AST SERPL-CCNC: 18 U/L (ref 15–37)
BASOPHILS ABSOLUTE: 0 K/UL (ref 0–0.2)
BASOPHILS RELATIVE PERCENT: 0.6 %
BILIRUB SERPL-MCNC: <0.2 MG/DL (ref 0–1)
BUN BLDV-MCNC: 22 MG/DL (ref 7–20)
CALCIUM SERPL-MCNC: 9.5 MG/DL (ref 8.3–10.6)
CHLORIDE BLD-SCNC: 104 MMOL/L (ref 99–110)
CO2: 28 MMOL/L (ref 21–32)
CREAT SERPL-MCNC: 0.6 MG/DL (ref 0.6–1.1)
EOSINOPHILS ABSOLUTE: 0.1 K/UL (ref 0–0.6)
EOSINOPHILS RELATIVE PERCENT: 2.5 %
GFR AFRICAN AMERICAN: >60
GFR NON-AFRICAN AMERICAN: >60
GLUCOSE BLD-MCNC: 108 MG/DL (ref 70–99)
HCT VFR BLD CALC: 40 % (ref 36–48)
HEMOGLOBIN: 12.8 G/DL (ref 12–16)
LYMPHOCYTES ABSOLUTE: 0.9 K/UL (ref 1–5.1)
LYMPHOCYTES RELATIVE PERCENT: 17.7 %
MCH RBC QN AUTO: 30.7 PG (ref 26–34)
MCHC RBC AUTO-ENTMCNC: 32.1 G/DL (ref 31–36)
MCV RBC AUTO: 95.6 FL (ref 80–100)
MONOCYTES ABSOLUTE: 0.3 K/UL (ref 0–1.3)
MONOCYTES RELATIVE PERCENT: 6.5 %
NEUTROPHILS ABSOLUTE: 3.8 K/UL (ref 1.7–7.7)
NEUTROPHILS RELATIVE PERCENT: 72.7 %
PDW BLD-RTO: 13.2 % (ref 12.4–15.4)
PLATELET # BLD: 248 K/UL (ref 135–450)
PMV BLD AUTO: 9 FL (ref 5–10.5)
POTASSIUM SERPL-SCNC: 5 MMOL/L (ref 3.5–5.1)
RBC # BLD: 4.18 M/UL (ref 4–5.2)
SODIUM BLD-SCNC: 142 MMOL/L (ref 136–145)
TOTAL PROTEIN: 6.7 G/DL (ref 6.4–8.2)
WBC # BLD: 5.2 K/UL (ref 4–11)

## 2021-12-17 PROCEDURE — 99212 OFFICE O/P EST SF 10 MIN: CPT | Performed by: INTERNAL MEDICINE

## 2021-12-17 PROCEDURE — 77080 DXA BONE DENSITY AXIAL: CPT

## 2021-12-17 RX ORDER — LOSARTAN POTASSIUM 50 MG/1
50 TABLET ORAL DAILY
Qty: 90 TABLET | Refills: 1 | Status: SHIPPED | OUTPATIENT
Start: 2021-12-17 | End: 2022-04-15

## 2021-12-17 RX ORDER — MONTELUKAST SODIUM 10 MG/1
10 TABLET ORAL NIGHTLY
Qty: 90 TABLET | Refills: 1 | Status: SHIPPED | OUTPATIENT
Start: 2021-12-17 | End: 2022-07-05

## 2021-12-17 RX ORDER — OMEPRAZOLE 20 MG/1
20 CAPSULE, DELAYED RELEASE ORAL DAILY
Qty: 90 CAPSULE | Refills: 1 | Status: SHIPPED | OUTPATIENT
Start: 2021-12-17 | End: 2022-03-16

## 2021-12-17 RX ORDER — POTASSIUM CHLORIDE 750 MG/1
10 TABLET, FILM COATED, EXTENDED RELEASE ORAL DAILY
Qty: 90 TABLET | Refills: 1 | Status: SHIPPED | OUTPATIENT
Start: 2021-12-17 | End: 2022-05-02

## 2021-12-17 NOTE — PROGRESS NOTES
Subjective:      Patient ID: Abdiel Vergara is a 63 y.o. female. HPI      63 y.o. female with h.o HTN,, MS on copaxone,   varicose veins, chronic pedal edema here for regular f.w     8/21 -  a fall on right hip leading to fracture, admitted to [facility] and had right she arthroplasty done and completed rehab    Again had a fall in lawn  11/21  Sustaining right tibial plateau fracture and now has knee brace, using crutches for ambulation   Still did not get bone scan given x4    ongoing   low back pain , scoliosis, continues to be  in pain all the time. Using vicodin now given by her neurology  No recent falls      Has h.o MS with mild cognitive impairment  - f/w Dr. Judit Cody at [address] .  Is on copoxone with good response but recently off this med for unclear reasons   Pt wishes not to take this med any more   No recent flare ups, no recent MRI       GERD - no issues Complaint with prilosec- constipation issues from hospital resolved      HTN - stable on meds      Leg edema resolved, but now reports right ankle edema given immobility  Recent venous doppler neg    Chronic depression -on wellbutrin by her neuro       Remote h.o transverse myelitis in cervical region     Lives with  and      Non smoker     Allergies   Allergen Reactions    Flagyl [Metronidazole]     Sulfa Antibiotics     Nickel Rash         Current Outpatient Medications   Medication Sig Dispense Refill    montelukast (SINGULAIR) 10 MG tablet Take 1 tablet by mouth in the evening 30 tablet 0    losartan (COZAAR) 50 MG tablet Take 1 tablet by mouth once daily 30 tablet 0    potassium chloride (KLOR-CON) 10 MEQ extended release tablet Take 1 tablet by mouth twice daily 180 tablet 1    EPINEPHrine (EPIPEN) 0.3 MG/0.3ML SOAJ injection INJECT CONTENTS OF 1 PEN AS NEEDED FOR ALLERGIC REACTION 2 each 0    triamterene-hydroCHLOROthiazide (MAXZIDE-25) 37.5-25 MG per tablet Take 1 tablet by mouth once daily 90 tablet 0    omeprazole (PRILOSEC) 20 MG delayed release capsule Take 1 capsule by mouth once daily 90 capsule 0    buPROPion (WELLBUTRIN XL) 300 MG extended release tablet Take 300 mg by mouth daily      Calcium Carbonate-Vitamin D (OYSTER SHELL CALCIUM/D) 500-200 MG-UNIT TABS Take 1 tablet by mouth daily      eletriptan (RELPAX) 40 MG tablet Take 40 mg by mouth 2 times daily as needed      modafinil (PROVIGIL) 200 MG tablet Take 300 mg by mouth daily.  cyclobenzaprine (FLEXERIL) 10 MG tablet TAKE 1 TABLET BY MOUTH TWICE DAILY AS NEEDED FOR MUSCLE SPASM 30 tablet 0    ciclopirox (PENLAC) 8 % solution APPLY  SOLUTION TOPICALLY TO AFFECTED AREA NIGHTLY 7 Bottle 0    gabapentin (NEURONTIN) 400 MG capsule Take 1 capsule by mouth 2 times daily 180 capsule 1    Glatiramer Acetate (COPAXONE SC) Inject  into the skin. No current facility-administered medications for this visit. Review of Systems   Constitutional: Negative for activity change, diaphoresis and unexpected weight change. HENT: Negative for congestion, ear discharge, hearing loss and trouble swallowing. Eyes: Negative for photophobia and visual disturbance. Respiratory: Negative for chest tightness and shortness of breath. Cardiovascular: Negative for chest pain, palpitations and leg swelling. Gastrointestinal: Negative for constipation and nausea. Endocrine: Negative for cold intolerance, heat intolerance and polyuria. Genitourinary: Negative for dysuria, flank pain and hematuria. Musculoskeletal: Positive for arthralgias and back pain. Negative for gait problem. Skin: Negative for color change. Allergic/Immunologic: Negative for immunocompromised state. Neurological: Positive for weakness. Negative for tremors, seizures and numbness. Psychiatric/Behavioral: Positive for depression  sleep disturbance. Negative for decreased concentration. The patient is not nervous/anxious.           There are no changes to past medical history, family history, social history or review of systems(except as noted in the history section) since prior note (all reviewed with patient). Objective:   Physical Exam  Vitals:    12/17/21 1123   BP: 125/75   Pulse: 70   Resp: 18         General:  Middle aged female Awake, alert and oriented. Appears to be not in any distress  Mucous Membranes:  Pink , anicteric  Neck: No JVD, no carotid bruit, no thyromegaly  Chest:  Clear to auscultation bilaterally, no added sounds  Cardiovascular:  RRR S1S2 heard, no murmurs or gallops  Abdomen:  Soft, undistended, non tender, no organomegaly, BS present  Extremities: 1+ right ankle edema   Distal pulses well felt   Right knee in brace   Neurological :  kyphotic spine , stuporous gait noted  Using crutches  Non focal        Wt Readings from Last 3 Encounters:   12/17/21 140 lb (63.5 kg)   07/26/21 136 lb (61.7 kg)   03/12/21 143 lb (64.9 kg)     CT right lower ext       CONCLUSION:   1. Avulsion fracture of the ACL. 2. Minimally depressed, posterolateral tibial plateau fracture. 3. Quadriceps tendinosis and enthesophytosis. 4. Moderate suprapatellar effusion. 5. Mild medial compartment arthrosis. 6. Moderate gastrocnemius bursal cyst.           MRI knee       1. Acute pivot-shift mechanism injury. ACL fibers intact, distally attached to an elevated    midline tibial plateau fracture involving the tibial spines. Additional osseous injuries    typical of a pivot-shift mechanism including a nondisplaced Marcella-Stieda fracture. Hemarthrosis. 2. Subtle free edge tear posterior horn-body junction medial meniscus. The meniscus contused. 3. Grade 2 sprain MCL.                 Assessment:       Diagnosis Orders   1. Benign essential HTN     2. Multiple sclerosis ([address]     3. DDD (degenerative disc disease), lumbar     4. Recurrent major depressive disorder, in partial remission ([address]     5.  Post-menopausal  YUMIKO Dexa Bone Density Scan           Plan: HTN - well controlled now with losartan and  maxzide .    Not using lasix any more     Chronic arthritis -off nsaids for GERD issues     GERD - on ppi     MS - and with mild congnitive decline - maintained well on copaxone - f/w Dr. Jossie Hooper ( neurology )   On zonegran and provigil as well        Major depressive disorder    -off cymbalta, now on wellbutrin 300 mg  by neurology  - stable       Low back pain with neuropathy of LE  - reports worsening pain issues  - Seen pain mx but did not continue  On vicodin, flexeril , Neurontin  per neurology prn  - might need brace with ongoing scoliosis/kyphosis   - home exercises recommended    Iron def anemia - resolved  Worsened again with recent surgery      Seasonal allergies - stable on singulair      Recurrent falls with hx of MS- recomemnd using cane all the time    Fracture of right inferior ischiopubic ramus /anterior column of right acetabulum- from recurrent falls 2019     with kyphotic spine and vertebral compression fractures 2020    S,p recent fall with right hip fracture s/p right hemiarthroplasty 5/21    Right tibial plateau fracture 38/59      Refused flu vaccine   need colonoscopy  Had  covid vaccine  Need dexa and mammo - recommend again

## 2021-12-20 DIAGNOSIS — M81.0 OSTEOPOROSIS, UNSPECIFIED OSTEOPOROSIS TYPE, UNSPECIFIED PATHOLOGICAL FRACTURE PRESENCE: Primary | ICD-10-CM

## 2021-12-20 RX ORDER — DENOSUMAB 60 MG/ML
60 INJECTION SUBCUTANEOUS ONCE
Qty: 180 ML | Refills: 1 | Status: SHIPPED | OUTPATIENT
Start: 2021-12-20 | End: 2022-06-24 | Stop reason: SINTOL

## 2021-12-22 DIAGNOSIS — M81.0 AGE-RELATED OSTEOPOROSIS WITHOUT CURRENT PATHOLOGICAL FRACTURE: ICD-10-CM

## 2022-02-14 ENCOUNTER — HOSPITAL ENCOUNTER (OUTPATIENT)
Age: 62
Discharge: HOME | End: 2022-02-14
Payer: COMMERCIAL

## 2022-02-14 LAB
A/G RATIO: 1.7 (ref 1.1–2.2)
ALBUMIN SERPL-MCNC: 4.3 G/DL (ref 3.4–5)
ALP BLD-CCNC: 129 U/L (ref 40–129)
ALT SERPL-CCNC: 22 U/L (ref 10–40)
ANION GAP SERPL CALCULATED.3IONS-SCNC: 8 MMOL/L (ref 3–16)
AST SERPL-CCNC: 21 U/L (ref 15–37)
BASOPHILS ABSOLUTE: 0.1 K/UL (ref 0–0.2)
BASOPHILS RELATIVE PERCENT: 1 %
BILIRUB SERPL-MCNC: <0.2 MG/DL (ref 0–1)
BUN BLDV-MCNC: 16 MG/DL (ref 7–20)
CALCIUM SERPL-MCNC: 9.7 MG/DL (ref 8.3–10.6)
CHLORIDE BLD-SCNC: 104 MMOL/L (ref 99–110)
CO2: 29 MMOL/L (ref 21–32)
CREAT SERPL-MCNC: 0.7 MG/DL (ref 0.6–1.1)
EOSINOPHILS ABSOLUTE: 0.2 K/UL (ref 0–0.6)
EOSINOPHILS RELATIVE PERCENT: 4 %
GFR AFRICAN AMERICAN: >60
GFR NON-AFRICAN AMERICAN: >60
GLUCOSE BLD-MCNC: 94 MG/DL (ref 70–99)
HCT VFR BLD CALC: 38.3 % (ref 36–48)
HEMOGLOBIN: 12.4 G/DL (ref 12–16)
LYMPHOCYTES ABSOLUTE: 1.7 K/UL (ref 1–5.1)
LYMPHOCYTES RELATIVE PERCENT: 32 %
MCH RBC QN AUTO: 30.3 PG (ref 26–34)
MCHC RBC AUTO-ENTMCNC: 32.5 G/DL (ref 31–36)
MCV RBC AUTO: 93.3 FL (ref 80–100)
MONOCYTES ABSOLUTE: 0.1 K/UL (ref 0–1.3)
MONOCYTES RELATIVE PERCENT: 2 %
NEUTROPHILS ABSOLUTE: 3.2 K/UL (ref 1.7–7.7)
NEUTROPHILS RELATIVE PERCENT: 61 %
PDW BLD-RTO: 13.1 % (ref 12.4–15.4)
PLATELET # BLD: 356 K/UL (ref 135–450)
PLATELET SLIDE REVIEW: ADEQUATE
PMV BLD AUTO: 7.5 FL (ref 5–10.5)
POTASSIUM SERPL-SCNC: 4 MMOL/L (ref 3.5–5.1)
RBC # BLD: 4.1 M/UL (ref 4–5.2)
SLIDE REVIEW: NORMAL
SODIUM BLD-SCNC: 141 MMOL/L (ref 136–145)
TOTAL PROTEIN: 6.9 G/DL (ref 6.4–8.2)
WBC # BLD: 5.3 K/UL (ref 4–11)

## 2022-02-14 PROCEDURE — 85007 BL SMEAR W/DIFF WBC COUNT: CPT

## 2022-02-14 PROCEDURE — 85027 COMPLETE CBC AUTOMATED: CPT

## 2022-02-14 PROCEDURE — 80053 COMPREHEN METABOLIC PANEL: CPT

## 2022-02-17 RX ORDER — TRIAMTERENE AND HYDROCHLOROTHIAZIDE 37.5; 25 MG/1; MG/1
TABLET ORAL
Qty: 90 TABLET | Refills: 0 | Status: SHIPPED | OUTPATIENT
Start: 2022-02-17 | End: 2022-05-02

## 2022-03-07 ENCOUNTER — TELEPHONE (OUTPATIENT)
Dept: NURSING | Age: 62
End: 2022-03-07

## 2022-03-07 NOTE — TELEPHONE ENCOUNTER
Pt called and asked for her prolia appointment on 3-11-21 be cancelled completely as she has other appts/surgeries that need to be completed first  Pt stated that she has our number and will call when she wants to reschedule the appt   She will let Dr. Donna Evans office know as well  appt for 3-11-21 will be cancelled per her request

## 2022-03-08 ENCOUNTER — TELEPHONE (OUTPATIENT)
Dept: INTERNAL MEDICINE CLINIC | Age: 62
End: 2022-03-08

## 2022-03-08 NOTE — TELEPHONE ENCOUNTER
----- Message from Nestor Booth MD sent at 3/8/2022 10:41 AM EST -----  Contact: Boni Flores [phone]  All meds for osteoporosis have same side effects. Prolia is one of the better ones  ----- Message -----  From: Darci Scherer  Sent: 3/7/2022   4:40 PM EST  To: Nestor Booth MD    Pt wants to know if she can take something other than prolia, requesting something that will not affect teeth/jaw. States she did already cancel the prolia injection.  ----- Message -----  From: Aaron Welch  Sent: 3/7/2022   4:36 PM EST  To: Darci Scherer      ----- Message -----  From: Nestor Booth MD  Sent: 3/7/2022   4:33 PM EST  To: Nestor Mosquera    Post pone prolia to next month  ----- Message -----  From: Aaron Welch  Sent: 3/7/2022   3:56 PM EST  To: Nestor Booth MD    Having surgery to have some squamous cells removed.   ----- Message -----  From: Nestor Booth MD  Sent: 3/7/2022   3:49 PM EST  To: Aaron Welch    What surgery  ----- Message -----  From: Aaron Welch  Sent: 3/7/2022   3:28 PM EST  To: Nestor Booth MD      ----- Message -----  From: Emma Palomo  Sent: 3/7/2022   2:51 PM EST  To: Saumya Oglesby MD    Patient called and stated that she is suppose to have a shot on Friday for Osteoporosis patient states she is going to have surgery next week and thinks she should cancel the shot. Please advise her. Patient also wanted to know if there was something different she could do other than the shot.         [address] [phone] (Ph: [phone]

## 2022-03-11 ENCOUNTER — HOSPITAL ENCOUNTER (OUTPATIENT)
Dept: NURSING | Age: 62
Setting detail: INFUSION SERIES
End: 2022-03-11

## 2022-03-17 RX ORDER — OMEPRAZOLE 20 MG/1
CAPSULE, DELAYED RELEASE ORAL
Qty: 90 CAPSULE | Refills: 0 | Status: SHIPPED | OUTPATIENT
Start: 2022-03-17 | End: 2022-05-31

## 2022-03-22 ENCOUNTER — HOSPITAL ENCOUNTER (OUTPATIENT)
Age: 62
Discharge: HOME | End: 2022-03-22
Payer: COMMERCIAL

## 2022-03-22 PROCEDURE — 80053 COMPREHEN METABOLIC PANEL: CPT

## 2022-03-22 PROCEDURE — 85025 COMPLETE CBC W/AUTO DIFF WBC: CPT

## 2022-03-22 PROCEDURE — 36415 COLL VENOUS BLD VENIPUNCTURE: CPT

## 2022-03-23 LAB
A/G RATIO: 1.9 (ref 1.1–2.2)
ALBUMIN SERPL-MCNC: 4.4 G/DL (ref 3.4–5)
ALP BLD-CCNC: 120 U/L (ref 40–129)
ALT SERPL-CCNC: 21 U/L (ref 10–40)
ANION GAP SERPL CALCULATED.3IONS-SCNC: 12 MMOL/L (ref 3–16)
AST SERPL-CCNC: 22 U/L (ref 15–37)
BASOPHILS ABSOLUTE: 0 K/UL (ref 0–0.2)
BASOPHILS RELATIVE PERCENT: 0.6 %
BILIRUB SERPL-MCNC: <0.2 MG/DL (ref 0–1)
BUN BLDV-MCNC: 17 MG/DL (ref 7–20)
CALCIUM SERPL-MCNC: 9.4 MG/DL (ref 8.3–10.6)
CHLORIDE BLD-SCNC: 104 MMOL/L (ref 99–110)
CO2: 25 MMOL/L (ref 21–32)
CREAT SERPL-MCNC: 0.7 MG/DL (ref 0.6–1.1)
EOSINOPHILS ABSOLUTE: 0.2 K/UL (ref 0–0.6)
EOSINOPHILS RELATIVE PERCENT: 3.6 %
GFR AFRICAN AMERICAN: >60
GFR NON-AFRICAN AMERICAN: >60
GLUCOSE BLD-MCNC: 94 MG/DL (ref 70–99)
HCT VFR BLD CALC: 36.3 % (ref 36–48)
HEMOGLOBIN: 12.2 G/DL (ref 12–16)
LYMPHOCYTES ABSOLUTE: 1.4 K/UL (ref 1–5.1)
LYMPHOCYTES RELATIVE PERCENT: 30.2 %
MCH RBC QN AUTO: 31.5 PG (ref 26–34)
MCHC RBC AUTO-ENTMCNC: 33.6 G/DL (ref 31–36)
MCV RBC AUTO: 93.8 FL (ref 80–100)
MONOCYTES ABSOLUTE: 0.3 K/UL (ref 0–1.3)
MONOCYTES RELATIVE PERCENT: 6.7 %
NEUTROPHILS ABSOLUTE: 2.8 K/UL (ref 1.7–7.7)
NEUTROPHILS RELATIVE PERCENT: 58.9 %
PDW BLD-RTO: 13.7 % (ref 12.4–15.4)
PLATELET # BLD: 236 K/UL (ref 135–450)
PMV BLD AUTO: 9.1 FL (ref 5–10.5)
POTASSIUM SERPL-SCNC: 3.8 MMOL/L (ref 3.5–5.1)
RBC # BLD: 3.87 M/UL (ref 4–5.2)
SODIUM BLD-SCNC: 141 MMOL/L (ref 136–145)
TOTAL PROTEIN: 6.7 G/DL (ref 6.4–8.2)
WBC # BLD: 4.7 K/UL (ref 4–11)

## 2022-04-15 RX ORDER — LOSARTAN POTASSIUM 50 MG/1
TABLET ORAL
Qty: 90 TABLET | Refills: 0 | Status: SHIPPED | OUTPATIENT
Start: 2022-04-15 | End: 2022-07-06

## 2022-05-02 RX ORDER — POTASSIUM CHLORIDE 750 MG/1
TABLET, FILM COATED, EXTENDED RELEASE ORAL
Qty: 60 TABLET | Refills: 0 | Status: SHIPPED | OUTPATIENT
Start: 2022-05-02 | End: 2022-05-31

## 2022-05-02 RX ORDER — TRIAMTERENE AND HYDROCHLOROTHIAZIDE 37.5; 25 MG/1; MG/1
TABLET ORAL
Qty: 30 TABLET | Refills: 0 | Status: SHIPPED | OUTPATIENT
Start: 2022-05-02 | End: 2022-05-31

## 2022-05-31 RX ORDER — TRIAMTERENE AND HYDROCHLOROTHIAZIDE 37.5; 25 MG/1; MG/1
TABLET ORAL
Qty: 90 TABLET | Refills: 0 | Status: SHIPPED | OUTPATIENT
Start: 2022-05-31 | End: 2022-08-05

## 2022-05-31 RX ORDER — OMEPRAZOLE 20 MG/1
CAPSULE, DELAYED RELEASE ORAL
Qty: 90 CAPSULE | Refills: 0 | Status: SHIPPED | OUTPATIENT
Start: 2022-05-31 | End: 2022-08-08

## 2022-05-31 RX ORDER — POTASSIUM CHLORIDE 750 MG/1
TABLET, FILM COATED, EXTENDED RELEASE ORAL
Qty: 180 TABLET | Refills: 0 | Status: SHIPPED | OUTPATIENT
Start: 2022-05-31 | End: 2022-08-18 | Stop reason: SDUPTHER

## 2022-06-17 ENCOUNTER — OFFICE VISIT (OUTPATIENT)
Dept: INTERNAL MEDICINE CLINIC | Age: 62
End: 2022-06-17

## 2022-06-17 VITALS
RESPIRATION RATE: 18 BRPM | BODY MASS INDEX: 23.16 KG/M2 | SYSTOLIC BLOOD PRESSURE: 135 MMHG | HEART RATE: 70 BPM | WEIGHT: 139 LBS | DIASTOLIC BLOOD PRESSURE: 78 MMHG | HEIGHT: 65 IN

## 2022-06-17 DIAGNOSIS — F33.41 RECURRENT MAJOR DEPRESSIVE DISORDER, IN PARTIAL REMISSION (HCC): ICD-10-CM

## 2022-06-17 DIAGNOSIS — I10 BENIGN ESSENTIAL HTN: Primary | ICD-10-CM

## 2022-06-17 DIAGNOSIS — Z23 NEED FOR TDAP VACCINATION: ICD-10-CM

## 2022-06-17 DIAGNOSIS — G35 MULTIPLE SCLEROSIS (HCC): ICD-10-CM

## 2022-06-17 DIAGNOSIS — M81.0 OSTEOPOROSIS, UNSPECIFIED OSTEOPOROSIS TYPE, UNSPECIFIED PATHOLOGICAL FRACTURE PRESENCE: ICD-10-CM

## 2022-06-17 PROCEDURE — 90471 IMMUNIZATION ADMIN: CPT | Performed by: INTERNAL MEDICINE

## 2022-06-17 PROCEDURE — 90715 TDAP VACCINE 7 YRS/> IM: CPT | Performed by: INTERNAL MEDICINE

## 2022-06-17 PROCEDURE — 99213 OFFICE O/P EST LOW 20 MIN: CPT | Performed by: INTERNAL MEDICINE

## 2022-06-17 NOTE — PROGRESS NOTES
Subjective:      Patient ID: Jose E Smith is a 63 y.o. female. HPI      63 y.o. female with h.o HTN,, MS on copaxone,   varicose veins, chronic pedal edema here for regular f.w     Has h.o MS with mild cognitive impairment  - f/w Dr. Yeison Rodriguez at [address] [place] [address] . Is on copoxone with good response but recently off this med for unclear reasons   Pt wishes not to take this med any more   No recent flare ups, no recent MRI         Since last time denies any new falls but has new left sided upper abd pain and upper back pain , has not had BM x 5 days, taking laxatives. Feels bloated. No n/v    Multiple falls with kyphosis and unstable gait over last 2 yrs , see below    8/21 -  a fall on right hip leading to fracture, admitted to [address] [place] [address] and had right she arthroplasty done and completed rehab    Again had a fall in lawn  11/21  Sustaining right tibial plateau fracture and now has knee brace, using crutches for ambulation       ongoing   low back pain , scoliosis, continues to be  in pain all the time.     Recently dx with osteoporosis but refused meds and wishes to talk about them today   Did not want prolia but wishes to consider options    Using vicodin now given by her neurology  No recent falls        GERD - no issues Complaint with prilosec- constipation issues from hospital resolved      HTN - stable on meds      Leg edema resolved, but now reports right ankle edema given immobility  Recent venous doppler neg    Chronic depression -on wellbutrin by her neuro       Remote h.o transverse myelitis in cervical region     Lives with  and      Non smoker     Allergies   Allergen Reactions    Flagyl [Metronidazole]     Sulfa Antibiotics     Nickel Rash         Current Outpatient Medications   Medication Sig Dispense Refill    montelukast (SINGULAIR) 10 MG tablet Take 1 tablet by mouth nightly 90 tablet 0    losartan (COZAAR) 50 MG tablet Take 1 tablet by mouth once daily 90 tablet 0    risedronate (ACTONEL) 35 MG tablet Take 1 tablet by mouth every 7 days Take once per week in the morning with a full glass of water, on an empty stomach, and do not take anything else by mouth or lie down for the next 30 minutes. 4 tablet 3    omeprazole (PRILOSEC) 20 MG delayed release capsule Take 1 capsule by mouth once daily 90 capsule 0    triamterene-hydroCHLOROthiazide (MAXZIDE-25) 37.5-25 MG per tablet Take 1 tablet by mouth once daily 90 tablet 0    potassium chloride (KLOR-CON) 10 MEQ extended release tablet Take 1 tablet by mouth twice daily 180 tablet 0    EPINEPHrine (EPIPEN) 0.3 MG/0.3ML SOAJ injection INJECT CONTENTS OF 1 PEN AS NEEDED FOR ALLERGIC REACTION 2 each 0    buPROPion (WELLBUTRIN XL) 300 MG extended release tablet Take 300 mg by mouth daily      Calcium Carbonate-Vitamin D (OYSTER SHELL CALCIUM/D) 500-200 MG-UNIT TABS Take 1 tablet by mouth daily      eletriptan (RELPAX) 40 MG tablet Take 40 mg by mouth 2 times daily as needed      modafinil (PROVIGIL) 200 MG tablet Take 300 mg by mouth daily.  cyclobenzaprine (FLEXERIL) 10 MG tablet TAKE 1 TABLET BY MOUTH TWICE DAILY AS NEEDED FOR MUSCLE SPASM 30 tablet 0    ciclopirox (PENLAC) 8 % solution APPLY  SOLUTION TOPICALLY TO AFFECTED AREA NIGHTLY 7 Bottle 0    gabapentin (NEURONTIN) 400 MG capsule Take 1 capsule by mouth 2 times daily 180 capsule 1    Glatiramer Acetate (COPAXONE SC) Inject  into the skin. No current facility-administered medications for this visit. Review of Systems   Constitutional: Negative for activity change, diaphoresis and unexpected weight change. HENT: Negative for congestion, ear discharge, hearing loss and trouble swallowing. Eyes: Negative for photophobia and visual disturbance. Respiratory: Negative for chest tightness and shortness of breath. Cardiovascular: Negative for chest pain, palpitations and leg swelling. Gastrointestinal: Negative for constipation and nausea. Endocrine: Negative for cold intolerance, heat intolerance and polyuria. Genitourinary: Negative for dysuria, flank pain and hematuria. Musculoskeletal: Positive for arthralgias and back pain. Negative for gait problem. Skin: Negative for color change. Allergic/Immunologic: Negative for immunocompromised state. Neurological: Positive for weakness. Negative for tremors, seizures and numbness. Psychiatric/Behavioral: Positive for depression  sleep disturbance. Negative for decreased concentration. The patient is not nervous/anxious. There are no changes to past medical history, family history, social history or review of systems(except as noted in the history section) since prior note (all reviewed with patient). Objective:   Physical Exam  Vitals:    06/17/22 1100   BP: 135/78   Pulse: 70   Resp: 18         General:  Middle aged female Awake, alert and oriented. Appears to be not in any distress  Mucous Membranes:  Pink , anicteric  Neck: No JVD, no carotid bruit, no thyromegaly  Chest:  Clear to auscultation bilaterally, no added sounds  Cardiovascular:  RRR S1S2 heard, no murmurs or gallops  Abdomen:  Soft, undistended, non tender, no organomegaly, BS present  Minimal left UQ pain   Extremities: 1+ right ankle edema   Distal pulses well felt   Right knee in brace   Neurological :  kyphotic spine , stuporous gait noted  Using cane   Non focal        Wt Readings from Last 3 Encounters:   06/17/22 139 lb (63 kg)   12/17/21 140 lb (63.5 kg)   07/26/21 136 lb (61.7 kg)     CT right lower ext       CONCLUSION:   1. Avulsion fracture of the ACL. 2. Minimally depressed, posterolateral tibial plateau fracture. 3. Quadriceps tendinosis and enthesophytosis. 4. Moderate suprapatellar effusion. 5. Mild medial compartment arthrosis. 6. Moderate gastrocnemius bursal cyst.           MRI knee       1. Acute pivot-shift mechanism injury.  ACL fibers intact, distally attached to an elevated midline tibial plateau fracture involving the tibial spines. Additional osseous injuries    typical of a pivot-shift mechanism including a nondisplaced Marcella-Stieda fracture. Hemarthrosis. 2. Subtle free edge tear posterior horn-body junction medial meniscus. The meniscus contused. 3. Grade 2 sprain MCL.                 Assessment:       Diagnosis Orders   1. Benign essential HTN     2. Osteoporosis, unspecified osteoporosis type, unspecified pathological fracture presence     3. Multiple sclerosis ([address]     4. Recurrent major depressive disorder, in partial remission ([address]     5. Need for Tdap vaccination  Tdap, ADACEL, (age 10y-63y), IM           Plan:          HTN - well controlled now with losartan and  maxzide .    Not using lasix any more     Chronic arthritis -off nsaids for GERD issues     GERD - on ppi     MS - and with mild congnitive decline - maintained well on copaxone - f/w Dr. Marcel Perdomo ( neurology )   On zonegran and provigil as well        Major depressive disorder    -off cymbalta, now on wellbutrin 450 mg  by neurology  - stable       Low back pain with neuropathy of LE  - reports worsening pain issues  - Seen pain mx but did not continue  On vicodin, flexeril , Neurontin  per neurology prn  - might need brace with ongoing scoliosis/kyphosis   - home exercises recommended  - repeat AXR today and lumbar xray for new left sided pain     Iron def anemia - resolved          Seasonal allergies - stable on singulair      Recurrent falls with hx of MS- recomemnd using cane all the time    Fracture of right inferior ischiopubic ramus /anterior column of right acetabulum- from recurrent falls 2019     with kyphotic spine and vertebral compression fractures 2020    S,p recent fall with right hip fracture s/p right hemiarthroplasty 5/21    Right tibial plateau fracture 74/49    Osteoporosis with pathological fractures - need to start meds  Given prolia but pt has not done yet  recommened to start meds ok for fosamax   Continue vit d supplements      Refused flu vaccine   need colonoscopy  Had  covid vaccine

## 2022-06-21 ENCOUNTER — TELEPHONE (OUTPATIENT)
Dept: INTERNAL MEDICINE CLINIC | Age: 62
End: 2022-06-21

## 2022-06-21 ENCOUNTER — HOSPITAL ENCOUNTER (OUTPATIENT)
Dept: GENERAL RADIOLOGY | Age: 62
Discharge: HOME | End: 2022-06-21
Payer: COMMERCIAL

## 2022-06-21 DIAGNOSIS — M54.50 LOW BACK PAIN, UNSPECIFIED BACK PAIN LATERALITY, UNSPECIFIED CHRONICITY, UNSPECIFIED WHETHER SCIATICA PRESENT: Primary | ICD-10-CM

## 2022-06-21 DIAGNOSIS — M54.50 LOW BACK PAIN, UNSPECIFIED BACK PAIN LATERALITY, UNSPECIFIED CHRONICITY, UNSPECIFIED WHETHER SCIATICA PRESENT: ICD-10-CM

## 2022-06-21 DIAGNOSIS — R10.12 LEFT UPPER QUADRANT ABDOMINAL PAIN: ICD-10-CM

## 2022-06-21 PROCEDURE — 72100 X-RAY EXAM L-S SPINE 2/3 VWS: CPT

## 2022-06-21 PROCEDURE — 74019 RADEX ABDOMEN 2 VIEWS: CPT

## 2022-06-21 RX ORDER — ALENDRONATE SODIUM 35 MG/1
35 TABLET ORAL
Qty: 4 TABLET | Refills: 2 | Status: SHIPPED | OUTPATIENT
Start: 2022-06-21 | End: 2022-06-24 | Stop reason: SINTOL

## 2022-06-21 NOTE — TELEPHONE ENCOUNTER
----- Message from Chad Najera MD sent at 6/21/2022  4:07 PM EDT -----  Contact: Nash Saldana   [phone]  Hold off xray if pain improved  ----- Message -----  From: Stefanie Quiroz  Sent: 6/21/2022   1:11 PM EDT  To: Chad Najera MD    Patient states she went to the bathroom this morning. Wants to know if she still needs the xray?  ----- Message -----  From: Chad Najera MD  Sent: 6/21/2022  12:57 PM EDT  To: Rudy Bailey [place] [address]    Fosamax 35 mg weekly - she did not want prolia    Xray lumbar spine 2 view  ----- Message -----  From: Ute Nevarez  Sent: 6/20/2022   4:39 PM EDT  To: Chad Najera MD    Patient called and stated that she was in for a appt. On Friday the June 17th and patient stateds was under the understanding that she was going to have a order put in for a xray. And that she was suppose to have some medication for osteoporosis patient stated.          [facility] DR MERY TRINH [address] [place] [address] [phone] (Ph: [phone])

## 2022-06-21 NOTE — TELEPHONE ENCOUNTER
----- Message from Dina Fox MD sent at 6/21/2022 12:56 PM EDT -----  Contact: Eric Gonzalez   [phone]  Fosamax 35 mg weekly - she did not want prolia    Xray lumbar spine 2 view  ----- Message -----  From: Lequita Dubin  Sent: 6/20/2022   4:39 PM EDT  To: Dina Fox MD    Patient called and stated that she was in for a appt. On Friday the June 17th and patient stateds was under the understanding that she was going to have a order put in for a xray. And that she was suppose to have some medication for osteoporosis patient stated.          [facility] DR MERY TRINH [address] [place] [address] [phone] (Ph: [phone])

## 2022-06-24 ENCOUNTER — TELEPHONE (OUTPATIENT)
Dept: INTERNAL MEDICINE CLINIC | Age: 62
End: 2022-06-24

## 2022-06-24 RX ORDER — RISEDRONATE SODIUM 35 MG/1
35 TABLET, FILM COATED ORAL
Qty: 4 TABLET | Refills: 3 | Status: SHIPPED | OUTPATIENT
Start: 2022-06-24 | End: 2022-10-20 | Stop reason: SDUPTHER

## 2022-06-24 NOTE — TELEPHONE ENCOUNTER
----- Message from Lety Jimenez MD sent at 6/24/2022  7:57 AM EDT -----  Maddie Blowers was done for lumbar vertebrae, abdomen only  ----- Message -----  From: Charles Najera  Sent: 6/23/2022   4:26 PM EDT  To: Lety Jimenez MD    Patient states her left hip has been bothering her and sometimes feels \"pulling\" or \"pulling together fast\" like a cramp. Patient is wondering if the wrong area was x-rayed. Please advise.

## 2022-06-24 NOTE — TELEPHONE ENCOUNTER
----- Message from Sejal Flynn MD sent at 6/24/2022  1:08 PM EDT -----  Sent  ----- Message -----  From: Leah Rios  Sent: 6/24/2022  12:23 PM EDT  To: Sejal Flynn MD    Patient states she did not want fosamax. She wanted you to prescribe Thresea Shon for the osteoporosis. Please advise. Dangelo Frye   ----- Message -----  From: Adwoa Gutiérrez  Sent: 6/24/2022   8:11 AM EDT  To: Leah Rios    Left message to return call.  ----- Message -----  From: Sejal Flynn MD  Sent: 6/24/2022   7:58 AM EDT  To: Shalini Yenifer was done for lumbar vertebrae, abdomen only  ----- Message -----  From: Adwoa Gutiérrez  Sent: 6/23/2022   4:26 PM EDT  To: Sejal Flynn MD    Patient states her left hip has been bothering her and sometimes feels \"pulling\" or \"pulling together fast\" like a cramp. Patient is wondering if the wrong area was x-rayed. Please advise.

## 2022-06-27 ENCOUNTER — TELEPHONE (OUTPATIENT)
Dept: INTERNAL MEDICINE CLINIC | Age: 62
End: 2022-06-27

## 2022-07-06 RX ORDER — MONTELUKAST SODIUM 10 MG/1
10 TABLET ORAL NIGHTLY
Qty: 90 TABLET | Refills: 0 | Status: SHIPPED | OUTPATIENT
Start: 2022-07-06 | End: 2022-10-10

## 2022-07-06 RX ORDER — LOSARTAN POTASSIUM 50 MG/1
TABLET ORAL
Qty: 90 TABLET | Refills: 0 | Status: SHIPPED | OUTPATIENT
Start: 2022-07-06 | End: 2022-10-10

## 2022-08-05 RX ORDER — TRIAMTERENE AND HYDROCHLOROTHIAZIDE 37.5; 25 MG/1; MG/1
TABLET ORAL
Qty: 90 TABLET | Refills: 0 | Status: SHIPPED | OUTPATIENT
Start: 2022-08-05 | End: 2022-09-01

## 2022-08-08 RX ORDER — OMEPRAZOLE 20 MG/1
CAPSULE, DELAYED RELEASE ORAL
Qty: 90 CAPSULE | Refills: 0 | Status: SHIPPED | OUTPATIENT
Start: 2022-08-08

## 2022-08-18 RX ORDER — POTASSIUM CHLORIDE 750 MG/1
TABLET, FILM COATED, EXTENDED RELEASE ORAL
Qty: 180 TABLET | Refills: 0 | Status: SHIPPED | OUTPATIENT
Start: 2022-08-18

## 2022-09-01 RX ORDER — TRIAMTERENE AND HYDROCHLOROTHIAZIDE 37.5; 25 MG/1; MG/1
TABLET ORAL
Qty: 90 TABLET | Refills: 0 | Status: SHIPPED | OUTPATIENT
Start: 2022-09-01

## 2022-09-16 RX ORDER — FUROSEMIDE 20 MG/1
TABLET ORAL
Qty: 30 TABLET | Refills: 0 | Status: SHIPPED | OUTPATIENT
Start: 2022-09-16

## 2022-10-10 RX ORDER — LOSARTAN POTASSIUM 50 MG/1
TABLET ORAL
Qty: 90 TABLET | Refills: 0 | Status: SHIPPED | OUTPATIENT
Start: 2022-10-10

## 2022-10-10 RX ORDER — MONTELUKAST SODIUM 10 MG/1
10 TABLET ORAL NIGHTLY
Qty: 90 TABLET | Refills: 0 | Status: SHIPPED | OUTPATIENT
Start: 2022-10-10

## 2022-10-20 RX ORDER — RISEDRONATE SODIUM 35 MG/1
35 TABLET, FILM COATED ORAL
Qty: 4 TABLET | Refills: 3 | Status: SHIPPED | OUTPATIENT
Start: 2022-10-20

## 2022-10-20 NOTE — TELEPHONE ENCOUNTER
----- Message from Levon Davis sent at 10/20/2022 10:32 AM EDT -----  Contact: Natalia Sorensen [phone]  Patient needs refill for risedronate (ACTONEL) 35 MG tablet.      Brielle Murray [address] [place] [address] Rd [phone] Bernarda Chappell [phone]    Thank you

## 2022-10-24 ENCOUNTER — TELEPHONE (OUTPATIENT)
Dept: INTERNAL MEDICINE CLINIC | Age: 62
End: 2022-10-24

## 2022-10-24 NOTE — TELEPHONE ENCOUNTER
----- Message from Corinne Case MD sent at 10/24/2022  4:07 PM EDT -----  No one contacted me    ----- Message -----  From: Maria Teresail Ilda  Sent: 10/24/2022   3:54 PM EDT  To: Corinne Csae MD    Patient was calling to see if you spoke with her [place] doctor? States that we do not need to call her back that she was also going to call [place].

## 2022-11-10 RX ORDER — FUROSEMIDE 20 MG/1
TABLET ORAL
Qty: 30 TABLET | Refills: 0 | Status: SHIPPED | OUTPATIENT
Start: 2022-11-10

## 2022-11-18 RX ORDER — POTASSIUM CHLORIDE 750 MG/1
TABLET, FILM COATED, EXTENDED RELEASE ORAL
Qty: 180 TABLET | Refills: 0 | Status: SHIPPED | OUTPATIENT
Start: 2022-11-18

## 2022-11-18 RX ORDER — OMEPRAZOLE 20 MG/1
CAPSULE, DELAYED RELEASE ORAL
Qty: 90 CAPSULE | Refills: 0 | Status: SHIPPED | OUTPATIENT
Start: 2022-11-18

## 2022-12-09 ENCOUNTER — OFFICE VISIT (OUTPATIENT)
Dept: INTERNAL MEDICINE CLINIC | Age: 62
End: 2022-12-09

## 2022-12-09 ENCOUNTER — TELEPHONE (OUTPATIENT)
Dept: INTERNAL MEDICINE CLINIC | Age: 62
End: 2022-12-09

## 2022-12-09 VITALS
BODY MASS INDEX: 24.16 KG/M2 | RESPIRATION RATE: 18 BRPM | WEIGHT: 145 LBS | HEART RATE: 70 BPM | SYSTOLIC BLOOD PRESSURE: 138 MMHG | DIASTOLIC BLOOD PRESSURE: 75 MMHG | HEIGHT: 65 IN

## 2022-12-09 DIAGNOSIS — I10 BENIGN ESSENTIAL HTN: Primary | ICD-10-CM

## 2022-12-09 DIAGNOSIS — F33.41 RECURRENT MAJOR DEPRESSIVE DISORDER, IN PARTIAL REMISSION (HCC): ICD-10-CM

## 2022-12-09 DIAGNOSIS — I10 BENIGN ESSENTIAL HTN: ICD-10-CM

## 2022-12-09 DIAGNOSIS — M81.0 OSTEOPOROSIS, UNSPECIFIED OSTEOPOROSIS TYPE, UNSPECIFIED PATHOLOGICAL FRACTURE PRESENCE: ICD-10-CM

## 2022-12-09 DIAGNOSIS — Z12.31 BREAST CANCER SCREENING BY MAMMOGRAM: ICD-10-CM

## 2022-12-09 DIAGNOSIS — G35 MULTIPLE SCLEROSIS (HCC): ICD-10-CM

## 2022-12-09 DIAGNOSIS — M51.36 DDD (DEGENERATIVE DISC DISEASE), LUMBAR: ICD-10-CM

## 2022-12-09 DIAGNOSIS — R60.0 LEG EDEMA, LEFT: ICD-10-CM

## 2022-12-09 PROCEDURE — 99213 OFFICE O/P EST LOW 20 MIN: CPT | Performed by: INTERNAL MEDICINE

## 2022-12-09 PROCEDURE — 3078F DIAST BP <80 MM HG: CPT | Performed by: INTERNAL MEDICINE

## 2022-12-09 PROCEDURE — 3074F SYST BP LT 130 MM HG: CPT | Performed by: INTERNAL MEDICINE

## 2022-12-09 NOTE — PROGRESS NOTES
Subjective:      Patient ID: Kaleigh Estrada is a 63 y.o. female. HPI      63 y.o. female with h.o HTN,, MS on copaxone,   varicose veins, chronic pedal edema here for regular f.w     Has h.o MS with mild cognitive impairment  - f/w Dr. Joseph at [address] [place] [address] . Is on copoxone with good response but recently off this med for unclear reasons   Pt wishes not to take this med any more   No recent flare ups, no recent MRI         Since last time denies any new falls   Multiple falls with kyphosis and unstable gait over last 2 yrs , see below    8/21 -  a fall on right hip leading to fracture, admitted to [facility] and had right she arthroplasty done and completed rehab    Again had a fall in lawn  11/21  Sustaining right tibial plateau fracture and now has knee brace, using crutches for ambulation       ongoing   low back pain , scoliosis, continues to be  in pain all the time.  Seen spine surgery at [facility] and planning for thoraco lumbar fusion to avoid kyphosis and falls next year     Recently dx with osteoporosis but refused meds and wishes to talk about them today   Did not want prolia but wishes to consider options    Using vicodin now given by her neurology  No recent falls        GERD - no issues Complaint with prilosec- constipation issues from hospital resolved      HTN - stable on meds      Leg edema resolved, but now reports right ankle edema given immobility  Recent venous doppler neg    Chronic depression -on wellbutrin by her neuro       Remote h.o transverse myelitis in cervical region     Lives with  and      Non smoker     Allergies   Allergen Reactions    Flagyl [Metronidazole]     Sulfa Antibiotics     Nickel Rash         Current Outpatient Medications   Medication Sig Dispense Refill    potassium chloride (KLOR-CON) 10 MEQ extended release tablet Take 1 tablet by mouth twice daily 180 tablet 0    omeprazole (PRILOSEC) 20 MG delayed release capsule Take 1 capsule by mouth once daily 90 capsule 0    furosemide (LASIX) 20 MG tablet TAKE 1 TABLET BY MOUTH ONCE DAILY AS NEEDED FOR SWELLING 30 tablet 0    risedronate (ACTONEL) 35 MG tablet Take 1 tablet by mouth every 7 days Take once per week in the morning with a full glass of water, on an empty stomach, and do not take anything else by mouth or lie down for the next 30 minutes. 4 tablet 3    losartan (COZAAR) 50 MG tablet Take 1 tablet by mouth once daily 90 tablet 0    montelukast (SINGULAIR) 10 MG tablet Take 1 tablet by mouth nightly 90 tablet 0    triamterene-hydroCHLOROthiazide (MAXZIDE-25) 37.5-25 MG per tablet Take 1 tablet by mouth once daily 90 tablet 0    EPINEPHrine (EPIPEN) 0.3 MG/0.3ML SOAJ injection INJECT CONTENTS OF 1 PEN AS NEEDED FOR ALLERGIC REACTION 2 each 0    buPROPion (WELLBUTRIN XL) 300 MG extended release tablet Take 300 mg by mouth daily      Calcium Carbonate-Vitamin D (OYSTER SHELL CALCIUM/D) 500-200 MG-UNIT TABS Take 1 tablet by mouth daily      eletriptan (RELPAX) 40 MG tablet Take 40 mg by mouth 2 times daily as needed      modafinil (PROVIGIL) 200 MG tablet Take 300 mg by mouth daily. cyclobenzaprine (FLEXERIL) 10 MG tablet TAKE 1 TABLET BY MOUTH TWICE DAILY AS NEEDED FOR MUSCLE SPASM 30 tablet 0    ciclopirox (PENLAC) 8 % solution APPLY  SOLUTION TOPICALLY TO AFFECTED AREA NIGHTLY 7 Bottle 0    gabapentin (NEURONTIN) 400 MG capsule Take 1 capsule by mouth 2 times daily 180 capsule 1    Glatiramer Acetate (COPAXONE SC) Inject  into the skin. No current facility-administered medications for this visit. Review of Systems   Constitutional: Negative for activity change, diaphoresis and unexpected weight change. HENT: Negative for congestion, ear discharge, hearing loss and trouble swallowing. Eyes: Negative for photophobia and visual disturbance. Respiratory: Negative for chest tightness and shortness of breath. Cardiovascular: Negative for chest pain, palpitations and leg swelling. Gastrointestinal: Negative for constipation and nausea. Endocrine: Negative for cold intolerance, heat intolerance and polyuria. Genitourinary: Negative for dysuria, flank pain and hematuria. Musculoskeletal: Positive for arthralgias and back pain. Negative for gait problem. Skin: Negative for color change. Allergic/Immunologic: Negative for immunocompromised state. Neurological: Positive for weakness. Negative for tremors, seizures and numbness. Psychiatric/Behavioral: Positive for depression  sleep disturbance. Negative for decreased concentration. The patient is not nervous/anxious. There are no changes to past medical history, family history, social history or review of systems(except as noted in the history section) since prior note (all reviewed with patient). Objective:   Physical Exam  There were no vitals filed for this visit. General:  Middle aged female Awake, alert and oriented. Appears to be not in any distress  Mucous Membranes:  Pink , anicteric  Neck: No JVD, no carotid bruit, no thyromegaly  Chest:  Clear to auscultation bilaterally, no added sounds  Cardiovascular:  RRR S1S2 heard, no murmurs or gallops  Abdomen:  Soft, undistended, non tender, no organomegaly, BS present  Minimal left UQ pain   Extremities: 1+ right ankle edema   Distal pulses well felt   Right knee in brace   Neurological :  kyphotic spine , stuporous gait noted  Using cane   Non focal        Wt Readings from Last 3 Encounters:   12/09/22 145 lb (65.8 kg)   06/17/22 139 lb (63 kg)   12/17/21 140 lb (63.5 kg)     CT right lower ext       CONCLUSION:   1. Avulsion fracture of the ACL. 2. Minimally depressed, posterolateral tibial plateau fracture. 3. Quadriceps tendinosis and enthesophytosis. 4. Moderate suprapatellar effusion. 5. Mild medial compartment arthrosis. 6. Moderate gastrocnemius bursal cyst.           MRI knee       1. Acute pivot-shift mechanism injury.  ACL fibers intact, distally attached to an elevated    midline tibial plateau fracture involving the tibial spines. Additional osseous injuries    typical of a pivot-shift mechanism including a nondisplaced Marcella-Stieda fracture. Hemarthrosis. 2. Subtle free edge tear posterior horn-body junction medial meniscus. The meniscus contused. 3. Grade 2 sprain MCL. Assessment:       Diagnosis Orders   1. Benign essential HTN        2. Multiple sclerosis ([facility]        3. Osteoporosis, unspecified osteoporosis type, unspecified pathological fracture presence        4. DDD (degenerative disc disease), lumbar        5. Recurrent major depressive disorder, in partial remission ([address]        6. Breast cancer screening by mammogram  YUM[facility]KO DIGITAL SCREEN W OR WO CAD BILATERAL                Plan:          HTN - essential  well controlled now with losartan and  maxzide .    Lasix prn   Chronic arthritis -off nsaids for GERD issues     GERD - on ppi     MS - and with mild congnitive decline - maintained well on copaxone - f/w Dr. Gustabo Santoyo ( neurology )   On zonegran and provigil as well        Major depressive disorder    -off cymbalta, now on wellbutrin 450 mg  by neurology  - stable       Low back pain with neuropathy of LE  - reports worsening pain issues  - Seen pain mx but did not continue  On vicodin, flexeril , Neurontin  per neurology prn  - might need brace with ongoing scoliosis/kyphosis   - home exercises recommended  - repeat AXR today and lumbar xray for new left sided pain     Iron def anemia - resolved      Seasonal allergies - stable on singulair      Recurrent falls with hx of MS- recomemnd using cane all the time    Fracture of right inferior ischiopubic ramus /anterior column of right acetabulum- from recurrent falls 2019     with kyphotic spine and vertebral compression fractures 2020    S,p recent fall with right hip fracture s/p right hemiarthroplasty 5/21    Right tibial plateau fracture 97/07    Osteoporosis with pathological fractures - need to start meds  Given prolia but pt has not done yet  Pt planned for spinal fusion soon, needs to start this med before surgery   recommened to start meds     Continue vit d supplements  Pt seen DR. Reza Rosas and planned for lumbar spinal fusion surgery after osteoporosis medication       Refused flu vaccine   need colonoscopy and mammogram  Had  covid vaccine

## 2022-12-09 NOTE — TELEPHONE ENCOUNTER
----- Message from Dusty Siddiqui MD sent at 12/9/2022  3:43 PM EST -----  Yes     ----- Message -----  From: Farnaz Kitchen  Sent: 12/9/2022   3:36 PM EST  To: Dusty Siddiqui MD    Pt wants to know if she should get the covid booster as she has upcoming surgery. Please advise.

## 2022-12-10 LAB
A/G RATIO: 1.7 (ref 1.1–2.2)
ALBUMIN SERPL-MCNC: 4.4 G/DL (ref 3.4–5)
ALP BLD-CCNC: 133 U/L (ref 40–129)
ALT SERPL-CCNC: 23 U/L (ref 10–40)
ANION GAP SERPL CALCULATED.3IONS-SCNC: 11 MMOL/L (ref 3–16)
AST SERPL-CCNC: 23 U/L (ref 15–37)
BASOPHILS ABSOLUTE: 0 K/UL (ref 0–0.2)
BASOPHILS RELATIVE PERCENT: 0.9 %
BILIRUB SERPL-MCNC: <0.2 MG/DL (ref 0–1)
BUN BLDV-MCNC: 31 MG/DL (ref 7–20)
CALCIUM SERPL-MCNC: 9.8 MG/DL (ref 8.3–10.6)
CHLORIDE BLD-SCNC: 101 MMOL/L (ref 99–110)
CO2: 29 MMOL/L (ref 21–32)
CREAT SERPL-MCNC: 0.7 MG/DL (ref 0.6–1.2)
EOSINOPHILS ABSOLUTE: 0.2 K/UL (ref 0–0.6)
EOSINOPHILS RELATIVE PERCENT: 4.3 %
GFR SERPL CREATININE-BSD FRML MDRD: >60 ML/MIN/{1.73_M2}
GLUCOSE BLD-MCNC: 80 MG/DL (ref 70–99)
HCT VFR BLD CALC: 39.8 % (ref 36–48)
HEMOGLOBIN: 12.6 G/DL (ref 12–16)
LYMPHOCYTES ABSOLUTE: 1.5 K/UL (ref 1–5.1)
LYMPHOCYTES RELATIVE PERCENT: 30.1 %
MCH RBC QN AUTO: 30.4 PG (ref 26–34)
MCHC RBC AUTO-ENTMCNC: 31.7 G/DL (ref 31–36)
MCV RBC AUTO: 95.8 FL (ref 80–100)
MONOCYTES ABSOLUTE: 0.5 K/UL (ref 0–1.3)
MONOCYTES RELATIVE PERCENT: 9.2 %
NEUTROPHILS ABSOLUTE: 2.8 K/UL (ref 1.7–7.7)
NEUTROPHILS RELATIVE PERCENT: 55.5 %
PDW BLD-RTO: 13.3 % (ref 12.4–15.4)
PLATELET # BLD: 268 K/UL (ref 135–450)
PMV BLD AUTO: 8.8 FL (ref 5–10.5)
POTASSIUM SERPL-SCNC: 4.8 MMOL/L (ref 3.5–5.1)
RBC # BLD: 4.15 M/UL (ref 4–5.2)
SODIUM BLD-SCNC: 141 MMOL/L (ref 136–145)
TOTAL PROTEIN: 7 G/DL (ref 6.4–8.2)
WBC # BLD: 5 K/UL (ref 4–11)

## 2022-12-12 RX ORDER — FUROSEMIDE 20 MG/1
TABLET ORAL
Qty: 30 TABLET | Refills: 0 | Status: SHIPPED | OUTPATIENT
Start: 2022-12-12

## 2023-01-04 ENCOUNTER — TELEPHONE (OUTPATIENT)
Dept: INTERNAL MEDICINE CLINIC | Age: 63
End: 2023-01-04

## 2023-01-04 RX ORDER — DENOSUMAB 120 MG/1.7ML
INJECTION SUBCUTANEOUS
Qty: 1.7 ML | Refills: 5 | Status: SHIPPED | OUTPATIENT
Start: 2023-01-04

## 2023-01-04 NOTE — PROGRESS NOTES
Pt to start on xgeva 120 mg subcut monthly  Daily calcium with vid D supplements  Monitor for bone pain   Pt has not started actonel

## 2023-01-04 NOTE — TELEPHONE ENCOUNTER
----- Message from He Wiggins MD sent at 1/4/2023  4:46 PM EST -----  Contact: Nuria Dhara [phone]  Call her  ----- Message -----  From: Napoleon Vargas  Sent: 12/20/2022   4:18 PM EST  To: He Wiggins MD    Patient is inquiring about medication you discussed with her on 12/9/22 visit for her spine and bones.      Thank you

## 2023-01-10 RX ORDER — LOSARTAN POTASSIUM 50 MG/1
TABLET ORAL
Qty: 90 TABLET | Refills: 0 | Status: SHIPPED | OUTPATIENT
Start: 2023-01-10

## 2023-01-10 RX ORDER — MONTELUKAST SODIUM 10 MG/1
10 TABLET ORAL NIGHTLY
Qty: 90 TABLET | Refills: 0 | Status: SHIPPED | OUTPATIENT
Start: 2023-01-10

## 2023-01-18 RX ORDER — DENOSUMAB 120 MG/1.7ML
INJECTION SUBCUTANEOUS
Qty: 1.7 ML | Refills: 5 | Status: SHIPPED | OUTPATIENT
Start: 2023-01-18

## 2023-01-25 ENCOUNTER — TELEPHONE (OUTPATIENT)
Dept: INTERNAL MEDICINE CLINIC | Age: 63
End: 2023-01-25

## 2023-01-25 NOTE — TELEPHONE ENCOUNTER
----- Message from Etelvina Gomez MD sent at 1/25/2023  3:30 PM EST -----  Contact: Maureen Laura, [phone]  Same dose prolia    ----- Message -----  From: Alma Nunez  Sent: 1/25/2023   1:51 PM EST  To: Etelvina Gomez MD    Franckrzysztof Alamotein is not covered, okay to change to Prolia? If so what strength?  ----- Message -----  From: Etelvina Gomez MD  Sent: 1/25/2023  12:48 PM EST  To: Stevie Mosquera    Prolia and Franchester Christy are the same  drug  Ok to change 120 mg as xgeva    ----- Message -----  From: Louis Mccabe  Sent: 1/25/2023  11:36 AM EST  To: Etelvina Gomez MD    Patient insurance will not cover (Griselda Jurado) 120 MG/1.7ML SOLN SC injection, Caller is asking if you would like to prescribe Prolia instead?        [address] [facility] [address] [phone]   [id number] [place] [address], [address] [place] [address]   Phone:  [phone]  Fax:  [phone]

## 2023-01-26 ENCOUNTER — TELEPHONE (OUTPATIENT)
Dept: INTERNAL MEDICINE CLINIC | Age: 63
End: 2023-01-26

## 2023-01-26 DIAGNOSIS — M81.0 AGE RELATED OSTEOPOROSIS, UNSPECIFIED PATHOLOGICAL FRACTURE PRESENCE: Primary | ICD-10-CM

## 2023-01-26 NOTE — TELEPHONE ENCOUNTER
----- Message from [facility] sent at 1/26/2023  3:47 PM EST -----  Contact: Patient [phone]  Patient is needing a prior authorization for denosumab (XGEVA) 120 MG/1.7ML SOLN SC injection before she can have surgery. Please advise.     [address] Jaimee Brown [place]

## 2023-01-26 NOTE — TELEPHONE ENCOUNTER
----- Message from Etelvina Gomez MD sent at 1/26/2023  2:42 PM EST -----  Contact: Lin Lee [phone]  Change to prolia once every 6 months  Insurance will approve      ----- Message -----  From: Louis Mccabe  Sent: 1/26/2023   1:23 PM EST  To: Etelvina Gomez MD    Caller states prior to being prescribed Prolia, patient has to do step therapy to fail at 60 mg.

## 2023-01-26 NOTE — TELEPHONE ENCOUNTER
Patient informed that we are not working on the PA and that [facility] is working on it. Informed patient that she will need to contact [phone] [facility] regarding this. Per Dr James Gates he would like patient to see Osteoporosis Specialist who can get medication approved for her. Dr Elsy Tellez [phone] message for patient to return call.

## 2023-01-26 NOTE — TELEPHONE ENCOUNTER
Patient is going to talk to Mia Dougherty and her insurance company and states not to change anything until she gets back with us.

## 2023-01-27 ENCOUNTER — TELEPHONE (OUTPATIENT)
Dept: INTERNAL MEDICINE CLINIC | Age: 63
End: 2023-01-27

## 2023-01-27 NOTE — TELEPHONE ENCOUNTER
----- Message from [facility] sent at 1/26/2023  4:31 PM EST -----  Contact: Patient [phone]  Per Dr Rosette Blanchard he would like patient to see Osteoporosis Specialist who can get medication approved for her. Dr Osmin Crocker [phone]-[facility] message for patient to return call.   ----- Message -----  From: Junaid Santos  Sent: 1/26/2023   4:07 PM EST  To: Joseph Eveline    Patient is needing a prior authorization for denosumab (XGEVA) 120 MG/1.7ML SOLN SC injection before she can have surgery. Please advise.     [address] [place] [address] Jaimee Granados [place]

## 2023-02-02 ENCOUNTER — TELEPHONE (OUTPATIENT)
Dept: ENDOCRINOLOGY | Age: 63
End: 2023-02-02

## 2023-02-03 RX ORDER — OMEPRAZOLE 20 MG/1
CAPSULE, DELAYED RELEASE ORAL
Qty: 30 CAPSULE | Refills: 1 | Status: SHIPPED | OUTPATIENT
Start: 2023-02-03

## 2023-02-03 RX ORDER — POTASSIUM CHLORIDE 750 MG/1
TABLET, FILM COATED, EXTENDED RELEASE ORAL
Qty: 60 TABLET | Refills: 1 | Status: SHIPPED | OUTPATIENT
Start: 2023-02-03

## 2023-02-03 RX ORDER — TRIAMTERENE AND HYDROCHLOROTHIAZIDE 37.5; 25 MG/1; MG/1
TABLET ORAL
Qty: 30 TABLET | Refills: 1 | Status: SHIPPED | OUTPATIENT
Start: 2023-02-03

## 2023-02-08 NOTE — TELEPHONE ENCOUNTER
----- Message from [facility] sent at 2/7/2023  5:03 PM EST -----  Contact: Shannon Hoyt [phone]  Patient states that she is not having surgery now and does not need the prolia. Ok to refill?   ----- Message -----  From: Bakari Chin MD  Sent: 2/7/2023   4:58 PM EST  To: [facility]    Did she see endocrinologist for prolia   ----- Message -----  From: DUSTIN [facility]  Sent: 2/7/2023   2:27 PM EST  To: Bakari Chin MD    I do not see this in patient's chart.   Please advise.   ----- Message -----  From: Pastor Torres  Sent: 2/7/2023   2:22 PM EST  To: Shanika Mosquera    Patient needs refill       risedronate (ACTONEL) 35 MG tablet     [address] [phone] Symone Art [phone] [address], [address] [address]   Phone:  [phone]  Fax:  [phone]

## 2023-02-09 RX ORDER — RISEDRONATE SODIUM 35 MG/1
35 TABLET, FILM COATED ORAL
Qty: 4 TABLET | Refills: 3 | Status: SHIPPED | OUTPATIENT
Start: 2023-02-09

## 2023-06-08 RX ORDER — RISEDRONATE SODIUM 35 MG/1
35 TABLET, FILM COATED ORAL
Qty: 4 TABLET | Refills: 3 | Status: SHIPPED | OUTPATIENT
Start: 2023-06-08

## 2023-06-21 ENCOUNTER — OFFICE VISIT (OUTPATIENT)
Dept: INTERNAL MEDICINE CLINIC | Age: 63
End: 2023-06-21

## 2023-06-21 VITALS
HEIGHT: 65 IN | WEIGHT: 138 LBS | RESPIRATION RATE: 18 BRPM | SYSTOLIC BLOOD PRESSURE: 130 MMHG | BODY MASS INDEX: 22.99 KG/M2 | DIASTOLIC BLOOD PRESSURE: 65 MMHG | HEART RATE: 65 BPM

## 2023-06-21 DIAGNOSIS — M81.0 AGE RELATED OSTEOPOROSIS, UNSPECIFIED PATHOLOGICAL FRACTURE PRESENCE: ICD-10-CM

## 2023-06-21 DIAGNOSIS — M51.36 DDD (DEGENERATIVE DISC DISEASE), LUMBAR: ICD-10-CM

## 2023-06-21 DIAGNOSIS — G35 MULTIPLE SCLEROSIS (HCC): ICD-10-CM

## 2023-06-21 DIAGNOSIS — F33.41 RECURRENT MAJOR DEPRESSIVE DISORDER, IN PARTIAL REMISSION (HCC): ICD-10-CM

## 2023-06-21 DIAGNOSIS — I10 BENIGN ESSENTIAL HTN: Primary | ICD-10-CM

## 2023-06-21 PROCEDURE — 99212 OFFICE O/P EST SF 10 MIN: CPT | Performed by: INTERNAL MEDICINE

## 2023-06-21 PROCEDURE — 3078F DIAST BP <80 MM HG: CPT | Performed by: INTERNAL MEDICINE

## 2023-06-21 PROCEDURE — 3075F SYST BP GE 130 - 139MM HG: CPT | Performed by: INTERNAL MEDICINE

## 2023-06-21 RX ORDER — EPINEPHRINE 0.3 MG/.3ML
INJECTION SUBCUTANEOUS
Qty: 2 EACH | Refills: 0 | Status: SHIPPED | OUTPATIENT
Start: 2023-06-21

## 2023-06-21 RX ORDER — TRIAMTERENE AND HYDROCHLOROTHIAZIDE 37.5; 25 MG/1; MG/1
1 TABLET ORAL DAILY
Qty: 90 TABLET | Refills: 0 | Status: SHIPPED | OUTPATIENT
Start: 2023-06-21

## 2023-06-21 RX ORDER — OMEPRAZOLE 20 MG/1
20 CAPSULE, DELAYED RELEASE ORAL DAILY
Qty: 90 CAPSULE | Refills: 0 | Status: SHIPPED | OUTPATIENT
Start: 2023-06-21

## 2023-06-21 RX ORDER — LOSARTAN POTASSIUM 50 MG/1
50 TABLET ORAL DAILY
Qty: 90 TABLET | Refills: 0 | Status: SHIPPED | OUTPATIENT
Start: 2023-06-21

## 2023-06-21 RX ORDER — MONTELUKAST SODIUM 10 MG/1
10 TABLET ORAL NIGHTLY
Qty: 90 TABLET | Refills: 0 | Status: SHIPPED | OUTPATIENT
Start: 2023-06-21

## 2023-06-21 RX ORDER — POTASSIUM CHLORIDE 750 MG/1
10 TABLET, FILM COATED, EXTENDED RELEASE ORAL 2 TIMES DAILY
Qty: 180 TABLET | Refills: 0 | Status: SHIPPED | OUTPATIENT
Start: 2023-06-21

## 2023-06-21 ASSESSMENT — PATIENT HEALTH QUESTIONNAIRE - PHQ9
5. POOR APPETITE OR OVEREATING: 0
7. TROUBLE CONCENTRATING ON THINGS, SUCH AS READING THE NEWSPAPER OR WATCHING TELEVISION: 1
1. LITTLE INTEREST OR PLEASURE IN DOING THINGS: 0
3. TROUBLE FALLING OR STAYING ASLEEP: 0
SUM OF ALL RESPONSES TO PHQ QUESTIONS 1-9: 3
4. FEELING TIRED OR HAVING LITTLE ENERGY: 1
2. FEELING DOWN, DEPRESSED OR HOPELESS: 1
SUM OF ALL RESPONSES TO PHQ9 QUESTIONS 1 & 2: 1
9. THOUGHTS THAT YOU WOULD BE BETTER OFF DEAD, OR OF HURTING YOURSELF: 0
SUM OF ALL RESPONSES TO PHQ QUESTIONS 1-9: 3
6. FEELING BAD ABOUT YOURSELF - OR THAT YOU ARE A FAILURE OR HAVE LET YOURSELF OR YOUR FAMILY DOWN: 0
8. MOVING OR SPEAKING SO SLOWLY THAT OTHER PEOPLE COULD HAVE NOTICED. OR THE OPPOSITE, BEING SO FIGETY OR RESTLESS THAT YOU HAVE BEEN MOVING AROUND A LOT MORE THAN USUAL: 0

## 2023-06-21 NOTE — PROGRESS NOTES
Subjective:      Patient ID: Barbara Diane is a 63 y.o. female. HPI      63 y.o. female with h.o HTN,, MS on copaxone,   varicose veins, chronic pedal edema here for regular f.w     Has h.o MS with mild cognitive impairment  - f/w Dr. Maggie Valdez at [address] [place] [address] . Is on copoxone with good response but recently off this med for unclear reasons   Pt wishes not to take this med any more   No recent flare ups,    Since last time denies any new falls   Multiple falls with kyphosis and unstable gait over last 2 yrs , see below    8/21 -  a fall on right hip leading to fracture, admitted to [address] [place] [address] and had right she arthroplasty done and completed rehab    Again had a fall in lawn  11/21  Sustaining right tibial plateau fracture and now has knee brace, using crutches for ambulation       ongoing   low back pain , scoliosis, continues to be  in pain all the time.  Seen spine surgery at [address] [place] [address] and planning for thoraco lumbar fusion to avoid kyphosis and falls next year     Recently dx with osteoporosis but refused meds and wishes to talk about them today   Did not want prolia but wishes to consider options    Using vicodin now given by her neurology  No recent falls        GERD - no issues Complaint with prilosec- constipation issues from hospital resolved      HTN - stable on meds      Leg edema resolved, but now reports right ankle edema given immobility  Recent venous doppler neg    Chronic depression -on wellbutrin by her neuro       Remote h.o transverse myelitis in cervical region     Lives with  and      Non smoker     Allergies   Allergen Reactions    Flagyl [Metronidazole]     Sulfa Antibiotics     Nickel Rash         Current Outpatient Medications   Medication Sig Dispense Refill    risedronate (ACTONEL) 35 MG tablet Take 1 tablet by mouth every 7 days Take once per week in the morning with a full glass of water, on an empty stomach, and do not take anything else by mouth or lie down for the

## 2023-09-15 ENCOUNTER — HOSPITAL ENCOUNTER (OUTPATIENT)
Dept: MAMMOGRAPHY | Age: 63
Discharge: HOME | End: 2023-09-15
Attending: INTERNAL MEDICINE
Payer: COMMERCIAL

## 2023-09-15 DIAGNOSIS — Z12.31 BREAST CANCER SCREENING BY MAMMOGRAM: ICD-10-CM

## 2023-09-15 PROCEDURE — 77063 BREAST TOMOSYNTHESIS BI: CPT

## 2023-09-21 RX ORDER — TRIAMTERENE AND HYDROCHLOROTHIAZIDE 37.5; 25 MG/1; MG/1
1 TABLET ORAL DAILY
Qty: 30 TABLET | Refills: 0 | Status: SHIPPED | OUTPATIENT
Start: 2023-09-21

## 2023-09-26 RX ORDER — EPINEPHRINE 0.3 MG/.3ML
INJECTION SUBCUTANEOUS
Qty: 2 EACH | Refills: 1 | Status: SHIPPED | OUTPATIENT
Start: 2023-09-26

## 2023-10-02 RX ORDER — LOSARTAN POTASSIUM 50 MG/1
50 TABLET ORAL DAILY
Qty: 90 TABLET | Refills: 0 | Status: SHIPPED | OUTPATIENT
Start: 2023-10-02

## 2023-10-02 RX ORDER — OMEPRAZOLE 20 MG/1
20 CAPSULE, DELAYED RELEASE ORAL DAILY
Qty: 90 CAPSULE | Refills: 0 | Status: SHIPPED | OUTPATIENT
Start: 2023-10-02

## 2023-10-02 RX ORDER — POTASSIUM CHLORIDE 750 MG/1
10 TABLET, FILM COATED, EXTENDED RELEASE ORAL 2 TIMES DAILY
Qty: 180 TABLET | Refills: 0 | Status: SHIPPED | OUTPATIENT
Start: 2023-10-02

## 2023-10-02 RX ORDER — MONTELUKAST SODIUM 10 MG/1
10 TABLET ORAL NIGHTLY
Qty: 90 TABLET | Refills: 0 | Status: SHIPPED | OUTPATIENT
Start: 2023-10-02

## 2023-10-26 RX ORDER — TRIAMTERENE AND HYDROCHLOROTHIAZIDE 37.5; 25 MG/1; MG/1
1 TABLET ORAL DAILY
Qty: 30 TABLET | Refills: 2 | Status: SHIPPED | OUTPATIENT
Start: 2023-10-26

## 2024-01-02 RX ORDER — MONTELUKAST SODIUM 10 MG/1
10 TABLET ORAL NIGHTLY
Qty: 90 TABLET | Refills: 0 | Status: SHIPPED | OUTPATIENT
Start: 2024-01-02 | End: 2024-01-05 | Stop reason: SDUPTHER

## 2024-01-02 RX ORDER — OMEPRAZOLE 20 MG/1
20 CAPSULE, DELAYED RELEASE ORAL DAILY
Qty: 90 CAPSULE | Refills: 0 | Status: SHIPPED | OUTPATIENT
Start: 2024-01-02 | End: 2024-01-05 | Stop reason: SDUPTHER

## 2024-01-02 RX ORDER — LOSARTAN POTASSIUM 50 MG/1
50 TABLET ORAL DAILY
Qty: 90 TABLET | Refills: 0 | Status: SHIPPED | OUTPATIENT
Start: 2024-01-02 | End: 2024-01-05 | Stop reason: SDUPTHER

## 2024-01-05 DIAGNOSIS — Z00.00 ANNUAL PHYSICAL EXAM: ICD-10-CM

## 2024-01-05 DIAGNOSIS — I10 BENIGN ESSENTIAL HTN: ICD-10-CM

## 2024-01-05 LAB
ALBUMIN SERPL-MCNC: 4.5 G/DL (ref 3.4–5)
ALBUMIN/GLOB SERPL: 2 {RATIO} (ref 1.1–2.2)
ALP SERPL-CCNC: 121 U/L (ref 40–129)
ALT SERPL-CCNC: 27 U/L (ref 10–40)
ANION GAP SERPL CALCULATED.3IONS-SCNC: 10 MMOL/L (ref 3–16)
AST SERPL-CCNC: 25 U/L (ref 15–37)
BASOPHILS # BLD: 0 K/UL (ref 0–0.2)
BASOPHILS NFR BLD: 0.3 %
BILIRUB SERPL-MCNC: <0.2 MG/DL (ref 0–1)
BUN SERPL-MCNC: 31 MG/DL (ref 7–20)
CALCIUM SERPL-MCNC: 9 MG/DL (ref 8.3–10.6)
CHLORIDE SERPL-SCNC: 105 MMOL/L (ref 99–110)
CHOLEST SERPL-MCNC: 228 MG/DL (ref 0–199)
CO2 SERPL-SCNC: 27 MMOL/L (ref 21–32)
CREAT SERPL-MCNC: 0.6 MG/DL (ref 0.6–1.2)
DEPRECATED RDW RBC AUTO: 13.2 % (ref 12.4–15.4)
EOSINOPHIL # BLD: 0.2 K/UL (ref 0–0.6)
EOSINOPHIL NFR BLD: 4.5 %
GFR SERPLBLD CREATININE-BSD FMLA CKD-EPI: >60 ML/MIN/{1.73_M2}
GLUCOSE SERPL-MCNC: 89 MG/DL (ref 70–99)
HCT VFR BLD AUTO: 39.3 % (ref 36–48)
HDLC SERPL-MCNC: 73 MG/DL (ref 40–60)
HGB BLD-MCNC: 13.1 G/DL (ref 12–16)
LDL CHOLESTEROL CALCULATED: 141 MG/DL
LYMPHOCYTES # BLD: 1 K/UL (ref 1–5.1)
LYMPHOCYTES NFR BLD: 26.6 %
MCH RBC QN AUTO: 31.4 PG (ref 26–34)
MCHC RBC AUTO-ENTMCNC: 33.4 G/DL (ref 31–36)
MCV RBC AUTO: 94.1 FL (ref 80–100)
MONOCYTES # BLD: 0.4 K/UL (ref 0–1.3)
MONOCYTES NFR BLD: 10 %
NEUTROPHILS # BLD: 2.3 K/UL (ref 1.7–7.7)
NEUTROPHILS NFR BLD: 58.6 %
PLATELET # BLD AUTO: 259 K/UL (ref 135–450)
PMV BLD AUTO: 8.7 FL (ref 5–10.5)
POTASSIUM SERPL-SCNC: 4.6 MMOL/L (ref 3.5–5.1)
PROT SERPL-MCNC: 6.7 G/DL (ref 6.4–8.2)
RBC # BLD AUTO: 4.18 M/UL (ref 4–5.2)
SODIUM SERPL-SCNC: 142 MMOL/L (ref 136–145)
TSH SERPL DL<=0.005 MIU/L-ACNC: 1.46 UIU/ML (ref 0.27–4.2)
URATE SERPL-MCNC: 3.2 MG/DL (ref 2.6–6)
VLDLC SERPL CALC-MCNC: 14 MG/DL
WBC # BLD AUTO: 3.9 K/UL (ref 4–11)

## 2024-01-08 RX ORDER — POTASSIUM CHLORIDE 750 MG/1
10 TABLET, FILM COATED, EXTENDED RELEASE ORAL 2 TIMES DAILY
Qty: 180 TABLET | Refills: 0 | Status: SHIPPED | OUTPATIENT
Start: 2024-01-08

## 2024-02-08 RX ORDER — EPINEPHRINE 0.3 MG/.3ML
INJECTION SUBCUTANEOUS
Qty: 2 EACH | Refills: 0 | Status: SHIPPED | OUTPATIENT
Start: 2024-02-08

## 2024-03-06 RX ORDER — EPINEPHRINE 0.3 MG/.3ML
INJECTION SUBCUTANEOUS
Qty: 2 EACH | Refills: 0 | Status: SHIPPED | OUTPATIENT
Start: 2024-03-06

## 2024-03-08 ENCOUNTER — HOSPITAL ENCOUNTER (OUTPATIENT)
Dept: GENERAL RADIOLOGY | Age: 64
Discharge: HOME | End: 2024-03-08
Payer: COMMERCIAL

## 2024-03-08 DIAGNOSIS — M81.0 SENILE OSTEOPOROSIS: ICD-10-CM

## 2024-03-08 PROCEDURE — 77080 DXA BONE DENSITY AXIAL: CPT

## 2024-05-07 RX ORDER — POTASSIUM CHLORIDE 750 MG/1
10 TABLET, FILM COATED, EXTENDED RELEASE ORAL 2 TIMES DAILY
Qty: 180 TABLET | Refills: 0 | Status: SHIPPED | OUTPATIENT
Start: 2024-05-07

## 2024-05-28 RX ORDER — POTASSIUM CHLORIDE 750 MG/1
10 TABLET, FILM COATED, EXTENDED RELEASE ORAL 2 TIMES DAILY
Qty: 180 TABLET | Refills: 0 | Status: SHIPPED | OUTPATIENT
Start: 2024-05-28

## 2024-06-24 RX ORDER — POTASSIUM CHLORIDE 750 MG/1
10 TABLET, FILM COATED, EXTENDED RELEASE ORAL 2 TIMES DAILY
Qty: 180 TABLET | Refills: 0 | Status: SHIPPED | OUTPATIENT
Start: 2024-06-24

## 2024-07-12 ENCOUNTER — OFFICE VISIT (OUTPATIENT)
Dept: INTERNAL MEDICINE CLINIC | Age: 64
End: 2024-07-12

## 2024-07-12 VITALS
HEIGHT: 65 IN | SYSTOLIC BLOOD PRESSURE: 130 MMHG | WEIGHT: 148 LBS | HEART RATE: 65 BPM | BODY MASS INDEX: 24.66 KG/M2 | DIASTOLIC BLOOD PRESSURE: 75 MMHG | RESPIRATION RATE: 18 BRPM

## 2024-07-12 DIAGNOSIS — F33.2 SEVERE EPISODE OF RECURRENT MAJOR DEPRESSIVE DISORDER, WITHOUT PSYCHOTIC FEATURES (HCC): ICD-10-CM

## 2024-07-12 DIAGNOSIS — Z12.11 COLON CANCER SCREENING: Primary | ICD-10-CM

## 2024-07-12 DIAGNOSIS — G35 MULTIPLE SCLEROSIS (HCC): ICD-10-CM

## 2024-07-12 DIAGNOSIS — I10 BENIGN ESSENTIAL HTN: ICD-10-CM

## 2024-07-12 DIAGNOSIS — K21.9 GASTROESOPHAGEAL REFLUX DISEASE WITHOUT ESOPHAGITIS: ICD-10-CM

## 2024-07-12 PROCEDURE — 3017F COLORECTAL CA SCREEN DOC REV: CPT | Performed by: INTERNAL MEDICINE

## 2024-07-12 PROCEDURE — 1036F TOBACCO NON-USER: CPT | Performed by: INTERNAL MEDICINE

## 2024-07-12 PROCEDURE — G8427 DOCREV CUR MEDS BY ELIG CLIN: HCPCS | Performed by: INTERNAL MEDICINE

## 2024-07-12 PROCEDURE — G8420 CALC BMI NORM PARAMETERS: HCPCS | Performed by: INTERNAL MEDICINE

## 2024-07-12 PROCEDURE — 3075F SYST BP GE 130 - 139MM HG: CPT | Performed by: INTERNAL MEDICINE

## 2024-07-12 PROCEDURE — 3078F DIAST BP <80 MM HG: CPT | Performed by: INTERNAL MEDICINE

## 2024-07-12 PROCEDURE — 99213 OFFICE O/P EST LOW 20 MIN: CPT | Performed by: INTERNAL MEDICINE

## 2024-07-12 RX ORDER — ROMOSOZUMAB-AQQG 105 MG/1.17ML
210 INJECTION, SOLUTION SUBCUTANEOUS
COMMUNITY

## 2024-07-12 RX ORDER — ZONISAMIDE 100 MG
300 CAPSULE ORAL DAILY
COMMUNITY
Start: 2024-05-30

## 2024-07-12 RX ORDER — BUPROPION HYDROCHLORIDE 150 MG/1
150 TABLET ORAL EVERY MORNING
COMMUNITY
Start: 2024-06-14

## 2024-07-12 NOTE — PROGRESS NOTES
Subjective:      Patient ID: Hellen Newell is a 63 y.o. female.    HPI      63 y.o. female with h.o HTN,, MS on copaxone,   varicose veins, chronic pedal edema here for regular f.w     Has h.o MS with mild cognitive impairment  - f/w Dr. Jia Benavides at [facility] . was on copaxone with good response but recently off this med for unclear reasons   Pt wishes not to take this med any more   No recent flare up    Since last time pt had couples of falls with no injuries   Multiple falls with kyphosis and unstable gait over last few yrs , see below    8/21 -  a fall on right hip leading to fracture, admitted to [facility] and had right she arthroplasty done    Again had a fall in lawn  11/21  Sustaining right tibial plateau fracture and now has knee brace, using crutches for ambulation    Again fell in 6/23 after dog pulled her , landed on right shoulder sustained proximal humerus fracture , tx conservatively       ongoing   low back pain , scoliosis, continues to be  in pain all the time. Seen spine surgery at [place] and planning for thoraco lumbar fusion to avoid kyphosis and falls  , after osteoporosis tx       osteoporosis , now on EVENITY monthly injections x 1 yr   F.w Dr. Lobo   Now using cane or walker to avoid falls    Using vicodin /flexeril /gabapentin now given by her neurology        HTN - stable on meds- losartan , maxzide   Uses lasix only as needed    GERD - no issues Complaint with prilosec-      Chronic anxiety and depression -on high dose wellbutrin by her neuro       Remote h.o transverse myelitis in cervical region     Lives with  and      Non smoker     Allergies   Allergen Reactions    Denosumab Dizziness or Vertigo    Flagyl [Metronidazole]     Sulfa Antibiotics     Nickel Rash     Past Medical History:   Diagnosis Date    Age related osteoporosis 12/22/2021    MS (congenital mitral stenosis)     MS (multiple sclerosis) ([facility])      Past Surgical History:   Procedure Laterality

## 2024-07-15 ENCOUNTER — TELEPHONE (OUTPATIENT)
Dept: INTERNAL MEDICINE CLINIC | Age: 64
End: 2024-07-15

## 2024-07-15 RX ORDER — FUROSEMIDE 20 MG/1
TABLET ORAL
Qty: 30 TABLET | Refills: 0 | Status: SHIPPED | OUTPATIENT
Start: 2024-07-15

## 2024-07-15 NOTE — TELEPHONE ENCOUNTER
----- Message from Joselito Thompson MD sent at 7/15/2024  8:00 AM EDT -----  Contact: [phone]  Yes   Lasix 20 mg daily prn for edema #30    ----- Message -----  From: Elizabeth Mosquera  Sent: 7/12/2024   3:23 PM EDT  To: Joselito Thompson MD    Ok to refill?   ----- Message -----  From: Medina Smith  Sent: 7/12/2024   3:20 PM EDT  To: Elizabeth Mosquera    Pt needs refill sent to [facility] pharmacy       furosemide (LASIX) 20 MG tablet    Pharmacy    [address] - P [phone] - F [phone]

## 2024-07-17 ENCOUNTER — TELEPHONE (OUTPATIENT)
Dept: INTERNAL MEDICINE CLINIC | Age: 64
End: 2024-07-17

## 2024-07-17 NOTE — TELEPHONE ENCOUNTER
----- Message from Joselito Thompson MD sent at 7/17/2024  2:26 PM EDT -----  Contact: Patient [phone]  I would continue which ever bone med her specialist is giving for 6 months more and think about back surgery   No surgery now as she still has osteoporosis    ----- Message -----  From: Stephani Cabrera  Sent: 7/17/2024  11:02 AM EDT  To: Joselito Thompson MD    Patient needs clarity on what you said about her bone density and surgery.  Does she need to wait 6 months for surgery and continue taking the Evenity for that 6 more months?  Please advise

## 2024-09-19 ENCOUNTER — TELEPHONE (OUTPATIENT)
Dept: INTERNAL MEDICINE CLINIC | Age: 64
End: 2024-09-19

## 2024-09-19 DIAGNOSIS — Z12.11 COLON CANCER SCREENING: Primary | ICD-10-CM

## 2024-09-19 LAB — NONINV COLON CA DNA+OCC BLD SCRN STL QL: NORMAL

## 2024-11-13 RX ORDER — TRIAMTERENE AND HYDROCHLOROTHIAZIDE 37.5; 25 MG/1; MG/1
1 TABLET ORAL DAILY
Qty: 90 TABLET | Refills: 0 | Status: SHIPPED | OUTPATIENT
Start: 2024-11-13

## 2024-11-21 RX ORDER — BUPROPION HYDROCHLORIDE 300 MG/1
300 TABLET ORAL DAILY
Qty: 90 TABLET | Refills: 0 | Status: SHIPPED | OUTPATIENT
Start: 2024-11-21

## 2024-11-21 RX ORDER — POTASSIUM CHLORIDE 750 MG/1
10 TABLET, EXTENDED RELEASE ORAL 2 TIMES DAILY
Qty: 180 TABLET | Refills: 0 | Status: SHIPPED | OUTPATIENT
Start: 2024-11-21

## 2024-11-22 RX ORDER — EPINEPHRINE 0.3 MG/.3ML
INJECTION SUBCUTANEOUS
Qty: 2 EACH | Refills: 0 | Status: SHIPPED | OUTPATIENT
Start: 2024-11-22

## 2024-12-11 ENCOUNTER — TELEPHONE (OUTPATIENT)
Dept: INTERNAL MEDICINE CLINIC | Age: 64
End: 2024-12-11

## 2024-12-11 DIAGNOSIS — M81.0 AGE RELATED OSTEOPOROSIS, UNSPECIFIED PATHOLOGICAL FRACTURE PRESENCE: Primary | ICD-10-CM

## 2024-12-11 NOTE — TELEPHONE ENCOUNTER
----- Message from Elizabeth LOO sent at 12/11/2024 11:11 AM EST -----  Contact: [phone]    ----- Message -----  From: Joselito Thompson MD  Sent: 12/11/2024  11:10 AM EST  To: Elizabeth Mosquera    Yes can order similar to last year  ----- Message -----  From: Medina Smith  Sent: 12/10/2024   3:52 PM EST  To: Joselito Thompson MD    Pt states she has a appt 12-13 for a mammogram, Pt asking if she can also get a dexa scan as well. Please advise

## 2024-12-13 ENCOUNTER — HOSPITAL ENCOUNTER (OUTPATIENT)
Dept: GENERAL RADIOLOGY | Age: 64
Discharge: HOME | End: 2024-12-13
Attending: INTERNAL MEDICINE
Payer: MEDICARE

## 2024-12-13 DIAGNOSIS — M81.0 AGE RELATED OSTEOPOROSIS, UNSPECIFIED PATHOLOGICAL FRACTURE PRESENCE: ICD-10-CM

## 2024-12-13 PROCEDURE — 77080 DXA BONE DENSITY AXIAL: CPT

## 2024-12-20 RX ORDER — BUPROPION HYDROCHLORIDE 300 MG/1
300 TABLET ORAL DAILY
Qty: 90 TABLET | Refills: 0 | OUTPATIENT
Start: 2024-12-20

## 2024-12-20 NOTE — TELEPHONE ENCOUNTER
Dr. Thompson wanted to verify dosage. Patient states this medication should not be filled by Dr. Thompson.

## 2025-01-07 ENCOUNTER — HOSPITAL ENCOUNTER (OUTPATIENT)
Dept: GENERAL RADIOLOGY | Age: 65
Discharge: HOME | End: 2025-01-07
Payer: MEDICARE

## 2025-01-07 ENCOUNTER — HOSPITAL ENCOUNTER (OUTPATIENT)
Age: 65
Discharge: HOME | End: 2025-01-07
Payer: MEDICARE

## 2025-01-07 ENCOUNTER — OFFICE VISIT (OUTPATIENT)
Dept: INTERNAL MEDICINE CLINIC | Age: 65
End: 2025-01-07

## 2025-01-07 VITALS
SYSTOLIC BLOOD PRESSURE: 140 MMHG | DIASTOLIC BLOOD PRESSURE: 90 MMHG | OXYGEN SATURATION: 98 % | BODY MASS INDEX: 23.32 KG/M2 | HEIGHT: 65 IN | TEMPERATURE: 97.5 F | RESPIRATION RATE: 18 BRPM | HEART RATE: 95 BPM | WEIGHT: 140 LBS

## 2025-01-07 DIAGNOSIS — Z01.818 PRE-OP EXAM: Primary | ICD-10-CM

## 2025-01-07 DIAGNOSIS — U07.1 COVID-19: ICD-10-CM

## 2025-01-07 DIAGNOSIS — F33.2 SEVERE EPISODE OF RECURRENT MAJOR DEPRESSIVE DISORDER, WITHOUT PSYCHOTIC FEATURES (HCC): ICD-10-CM

## 2025-01-07 DIAGNOSIS — R05.1 ACUTE COUGH: ICD-10-CM

## 2025-01-07 DIAGNOSIS — M81.0 AGE RELATED OSTEOPOROSIS, UNSPECIFIED PATHOLOGICAL FRACTURE PRESENCE: ICD-10-CM

## 2025-01-07 DIAGNOSIS — G35 MULTIPLE SCLEROSIS (HCC): ICD-10-CM

## 2025-01-07 DIAGNOSIS — I10 BENIGN ESSENTIAL HTN: ICD-10-CM

## 2025-01-07 DIAGNOSIS — Z01.818 PRE-OP EXAM: ICD-10-CM

## 2025-01-07 DIAGNOSIS — K21.9 GASTROESOPHAGEAL REFLUX DISEASE WITHOUT ESOPHAGITIS: ICD-10-CM

## 2025-01-07 DIAGNOSIS — M51.369 DEGENERATION OF INTERVERTEBRAL DISC OF LUMBAR REGION, UNSPECIFIED WHETHER PAIN PRESENT: ICD-10-CM

## 2025-01-07 DIAGNOSIS — R06.02 SHORTNESS OF BREATH: ICD-10-CM

## 2025-01-07 DIAGNOSIS — G62.9 PERIPHERAL POLYNEUROPATHY: ICD-10-CM

## 2025-01-07 LAB
EKG ATRIAL RATE: 81 BPM
EKG DIAGNOSIS: NORMAL
EKG P AXIS: 76 DEGREES
EKG P-R INTERVAL: 118 MS
EKG Q-T INTERVAL: 382 MS
EKG QRS DURATION: 82 MS
EKG QTC CALCULATION (BAZETT): 443 MS
EKG R AXIS: 71 DEGREES
EKG T AXIS: 74 DEGREES
EKG VENTRICULAR RATE: 81 BPM

## 2025-01-07 PROCEDURE — 3080F DIAST BP >= 90 MM HG: CPT | Performed by: NURSE PRACTITIONER

## 2025-01-07 PROCEDURE — 3077F SYST BP >= 140 MM HG: CPT | Performed by: NURSE PRACTITIONER

## 2025-01-07 PROCEDURE — 93005 ELECTROCARDIOGRAM TRACING: CPT

## 2025-01-07 PROCEDURE — 71046 X-RAY EXAM CHEST 2 VIEWS: CPT

## 2025-01-07 PROCEDURE — 93010 ELECTROCARDIOGRAM REPORT: CPT | Performed by: INTERNAL MEDICINE

## 2025-01-07 PROCEDURE — 99214 OFFICE O/P EST MOD 30 MIN: CPT | Performed by: NURSE PRACTITIONER

## 2025-01-07 RX ORDER — FERROUS SULFATE 325(65) MG
325 TABLET ORAL
COMMUNITY

## 2025-01-07 RX ORDER — HYDROCODONE BITARTRATE AND ACETAMINOPHEN 5; 325 MG/1; MG/1
1 TABLET ORAL 3 TIMES DAILY PRN
COMMUNITY

## 2025-01-07 RX ORDER — CEPHALEXIN 500 MG/1
500 CAPSULE ORAL 2 TIMES DAILY
COMMUNITY

## 2025-01-07 RX ORDER — CHOLECALCIFEROL (VITAMIN D3) 50 MCG
10000 CAPSULE ORAL DAILY
COMMUNITY

## 2025-01-07 RX ORDER — M-VIT,TX,IRON,MINS/CALC/FOLIC 27MG-0.4MG
1 TABLET ORAL DAILY
COMMUNITY

## 2025-01-07 RX ORDER — GLUCOSAM/CHON-MSM1/C/MANG/BOSW 750-644 MG
2 TABLET ORAL DAILY
COMMUNITY

## 2025-01-07 RX ORDER — MULTIVITAMIN WITH IRON
500 TABLET ORAL DAILY
COMMUNITY

## 2025-01-07 ASSESSMENT — PATIENT HEALTH QUESTIONNAIRE - PHQ9
1. LITTLE INTEREST OR PLEASURE IN DOING THINGS: NEARLY EVERY DAY
SUM OF ALL RESPONSES TO PHQ QUESTIONS 1-9: 24
2. FEELING DOWN, DEPRESSED OR HOPELESS: NEARLY EVERY DAY
SUM OF ALL RESPONSES TO PHQ QUESTIONS 1-9: 24
6. FEELING BAD ABOUT YOURSELF - OR THAT YOU ARE A FAILURE OR HAVE LET YOURSELF OR YOUR FAMILY DOWN: NEARLY EVERY DAY
SUM OF ALL RESPONSES TO PHQ9 QUESTIONS 1 & 2: 6
SUM OF ALL RESPONSES TO PHQ QUESTIONS 1-9: 24
7. TROUBLE CONCENTRATING ON THINGS, SUCH AS READING THE NEWSPAPER OR WATCHING TELEVISION: NEARLY EVERY DAY
SUM OF ALL RESPONSES TO PHQ QUESTIONS 1-9: 24
5. POOR APPETITE OR OVEREATING: NEARLY EVERY DAY
8. MOVING OR SPEAKING SO SLOWLY THAT OTHER PEOPLE COULD HAVE NOTICED. OR THE OPPOSITE, BEING SO FIGETY OR RESTLESS THAT YOU HAVE BEEN MOVING AROUND A LOT MORE THAN USUAL: NEARLY EVERY DAY
3. TROUBLE FALLING OR STAYING ASLEEP: NEARLY EVERY DAY
9. THOUGHTS THAT YOU WOULD BE BETTER OFF DEAD, OR OF HURTING YOURSELF: NOT AT ALL
4. FEELING TIRED OR HAVING LITTLE ENERGY: NEARLY EVERY DAY
10. IF YOU CHECKED OFF ANY PROBLEMS, HOW DIFFICULT HAVE THESE PROBLEMS MADE IT FOR YOU TO DO YOUR WORK, TAKE CARE OF THINGS AT HOME, OR GET ALONG WITH OTHER PEOPLE: EXTREMELY DIFFICULT

## 2025-01-07 ASSESSMENT — COLUMBIA-SUICIDE SEVERITY RATING SCALE - C-SSRS
2. HAVE YOU ACTUALLY HAD ANY THOUGHTS OF KILLING YOURSELF?: NO
1. WITHIN THE PAST MONTH, HAVE YOU WISHED YOU WERE DEAD OR WISHED YOU COULD GO TO SLEEP AND NOT WAKE UP?: NO
6. HAVE YOU EVER DONE ANYTHING, STARTED TO DO ANYTHING, OR PREPARED TO DO ANYTHING TO END YOUR LIFE?: NO

## 2025-01-08 ENCOUNTER — TELEPHONE (OUTPATIENT)
Dept: INTERNAL MEDICINE CLINIC | Age: 65
End: 2025-01-08

## 2025-01-08 DIAGNOSIS — Z01.818 PRE-OP EXAM: Primary | ICD-10-CM

## 2025-01-08 LAB
ALBUMIN SERPL-MCNC: 4.8 G/DL (ref 3.4–5)
ALBUMIN/GLOB SERPL: 2.1 {RATIO} (ref 1.1–2.2)
ALP SERPL-CCNC: 101 U/L (ref 40–129)
ALT SERPL-CCNC: 35 U/L (ref 10–40)
ANION GAP SERPL CALCULATED.3IONS-SCNC: 12 MMOL/L (ref 3–16)
APTT BLD: 28.5 SEC (ref 22.1–36.4)
AST SERPL-CCNC: 27 U/L (ref 15–37)
BASOPHILS # BLD: 0.1 K/UL (ref 0–0.2)
BASOPHILS NFR BLD: 1 %
BILIRUB SERPL-MCNC: <0.2 MG/DL (ref 0–1)
BUN SERPL-MCNC: 24 MG/DL (ref 7–20)
CALCIUM SERPL-MCNC: 10.8 MG/DL (ref 8.3–10.6)
CHLORIDE SERPL-SCNC: 101 MMOL/L (ref 99–110)
CO2 SERPL-SCNC: 27 MMOL/L (ref 21–32)
CREAT SERPL-MCNC: 0.8 MG/DL (ref 0.6–1.2)
DEPRECATED RDW RBC AUTO: 12.3 % (ref 12.4–15.4)
EOSINOPHIL # BLD: 0.1 K/UL (ref 0–0.6)
EOSINOPHIL NFR BLD: 2.6 %
GFR SERPLBLD CREATININE-BSD FMLA CKD-EPI: 83 ML/MIN/{1.73_M2}
GLUCOSE SERPL-MCNC: 87 MG/DL (ref 70–99)
HCT VFR BLD AUTO: 42.3 % (ref 36–48)
HGB BLD-MCNC: 13.6 G/DL (ref 12–16)
INR PPP: 0.95 (ref 0.85–1.15)
LYMPHOCYTES # BLD: 1.9 K/UL (ref 1–5.1)
LYMPHOCYTES NFR BLD: 35 %
MCH RBC QN AUTO: 30.7 PG (ref 26–34)
MCHC RBC AUTO-ENTMCNC: 32.3 G/DL (ref 31–36)
MCV RBC AUTO: 95.1 FL (ref 80–100)
MONOCYTES # BLD: 0.4 K/UL (ref 0–1.3)
MONOCYTES NFR BLD: 7.4 %
NEUTROPHILS # BLD: 3 K/UL (ref 1.7–7.7)
NEUTROPHILS NFR BLD: 54 %
PLATELET # BLD AUTO: 312 K/UL (ref 135–450)
PMV BLD AUTO: 9.2 FL (ref 5–10.5)
POTASSIUM SERPL-SCNC: 4.8 MMOL/L (ref 3.5–5.1)
PROT SERPL-MCNC: 7.1 G/DL (ref 6.4–8.2)
PROTHROMBIN TIME: 12.9 SEC (ref 11.9–14.9)
RBC # BLD AUTO: 4.44 M/UL (ref 4–5.2)
SODIUM SERPL-SCNC: 140 MMOL/L (ref 136–145)
WBC # BLD AUTO: 5.6 K/UL (ref 4–11)

## 2025-01-08 RX ORDER — AZITHROMYCIN 250 MG/1
TABLET, FILM COATED ORAL
Qty: 6 TABLET | Refills: 0 | Status: SHIPPED | OUTPATIENT
Start: 2025-01-08 | End: 2025-01-18

## 2025-01-08 NOTE — TELEPHONE ENCOUNTER
----- Message from AGNES Downs CNP sent at 1/8/2025 12:40 PM EST -----  Contact: [phone]  I will sent antibiotic to pharmacy.   Take OTC medication.   Should hold off on surgery until she is feeling better.  ----- Message -----  From: Ioana Grossman  Sent: 1/8/2025  12:00 PM EST  To: AGNES Downs CNP    You seen pt yesterday. Please advise. Surgery currently scheduled for 1/15/25.  ----- Message -----  From: Medina Smith  Sent: 1/8/2025  11:01 AM EST  To: Joselito Thompson MD    Pt states she has Covid and was in office yesterday. Pt asking if she can continue taking OTC medication to help keep her temp that has been 100.8-101 down. Pt states she has scheduled the Echo on Jan 21 st and asking if this is okay? Pt also asking if she should have her upcoming surgery due to being so sick? Please advise     [facility]

## 2025-01-08 NOTE — TELEPHONE ENCOUNTER
----- Message from AGNES Downs CNP sent at 1/8/2025  8:29 AM EST -----  Not a good read on the EKG.  There is lots of artifact.   We should get a repeat EKG when she is not coughing

## 2025-01-08 NOTE — TELEPHONE ENCOUNTER
----- Message from AGNES Downs CNP sent at 1/8/2025 11:53 AM EST -----  Contact: PERI [phone]  I am waiting on echocardiogram to be completed as well as repeat EKG.   She is not cleared at this time.  She has had COVID recently.  Lungs were clear but continues to have a cough  ----- Message -----  From: Ioana Grossman  Sent: 1/8/2025  11:48 AM EST  To: AGNES Downs CNP    You seen pt for h&p. Please advise.  ----- Message -----  From: Lane Judd  Sent: 1/8/2025   9:15 AM EST  To: MD Peri Weinstein at [facility] states the patient has an upcoming back procedure on 1/15, and the patient received her H&P yesterday 1/7, but they are in need of an expand objective for the heart and lungs. Please advise       FAX # [phone]

## 2025-01-13 RX ORDER — LOSARTAN POTASSIUM 50 MG/1
50 TABLET ORAL DAILY
Qty: 30 TABLET | Refills: 2 | Status: SHIPPED | OUTPATIENT
Start: 2025-01-13

## 2025-01-21 ENCOUNTER — HOSPITAL ENCOUNTER (OUTPATIENT)
Age: 65
Discharge: HOME | End: 2025-01-21
Payer: MEDICARE

## 2025-01-21 ENCOUNTER — HOSPITAL ENCOUNTER (OUTPATIENT)
Age: 65
Discharge: HOME | End: 2025-01-23
Payer: MEDICARE

## 2025-01-21 DIAGNOSIS — R06.02 SHORTNESS OF BREATH: ICD-10-CM

## 2025-01-21 LAB
ECHO AO ASC DIAM: 3.2 CM
ECHO AO ROOT DIAM: 2.9 CM
ECHO AV MEAN GRADIENT: 3 MMHG
ECHO AV MEAN VELOCITY: 0.8 M/S
ECHO AV PEAK GRADIENT: 6 MMHG
ECHO AV PEAK VELOCITY: 1.3 M/S
ECHO AV VELOCITY RATIO: 0.77
ECHO AV VTI: 26.9 CM
ECHO EST RA PRESSURE: 3 MMHG
ECHO IVC EXP: 2.1 CM
ECHO IVC INSP: 1 CM
ECHO LA AREA 2C: 19.2 CM2
ECHO LA AREA 4C: 19.2 CM2
ECHO LA MAJOR AXIS: 5.8 CM
ECHO LA MINOR AXIS: 5.6 CM
ECHO LA VOL BP: 54 ML (ref 22–52)
ECHO LA VOL MOD A2C: 54 ML (ref 22–52)
ECHO LA VOL MOD A4C: 39 ML (ref 22–52)
ECHO LV E' LATERAL VELOCITY: 13.4 CM/S
ECHO LV E' SEPTAL VELOCITY: 10.3 CM/S
ECHO LV EF PHYSICIAN: 58 %
ECHO LV FRACTIONAL SHORTENING: 40 % (ref 28–44)
ECHO LV INTERNAL DIMENSION DIASTOLIC: 4.8 CM (ref 3.9–5.3)
ECHO LV INTERNAL DIMENSION SYSTOLIC: 2.9 CM
ECHO LV ISOVOLUMETRIC RELAXATION TIME (IVRT): 87 MS
ECHO LV IVSD: 0.9 CM (ref 0.6–0.9)
ECHO LV MASS 2D: 137.1 G (ref 67–162)
ECHO LV POSTERIOR WALL DIASTOLIC: 0.8 CM (ref 0.6–0.9)
ECHO LV RELATIVE WALL THICKNESS RATIO: 0.33
ECHO LVOT AV VTI INDEX: 0.78
ECHO LVOT MEAN GRADIENT: 2 MMHG
ECHO LVOT PEAK GRADIENT: 4 MMHG
ECHO LVOT PEAK VELOCITY: 1 M/S
ECHO LVOT VTI: 21 CM
ECHO MV A VELOCITY: 0.8 M/S
ECHO MV E DECELERATION TIME (DT): 206 MS
ECHO MV E VELOCITY: 0.9 M/S
ECHO MV E/A RATIO: 1.13
ECHO MV E/E' LATERAL: 6.72
ECHO MV E/E' RATIO (AVERAGED): 7.73
ECHO MV E/E' SEPTAL: 8.74
ECHO MV LVOT VTI INDEX: 1.5
ECHO MV MAX VELOCITY: 1 M/S
ECHO MV MEAN GRADIENT: 4 MMHG
ECHO MV MEAN VELOCITY: 0.7 M/S
ECHO MV PEAK GRADIENT: 4 MMHG
ECHO MV VTI: 31.6 CM
ECHO PV MAX VELOCITY: 0.7 M/S
ECHO PV MEAN GRADIENT: 1 MMHG
ECHO PV MEAN VELOCITY: 0.5 M/S
ECHO PV PEAK GRADIENT: 2 MMHG
ECHO PV VTI: 14.7 CM
ECHO RA AREA 4C: 14.2 CM2
ECHO RA VOLUME: 33 ML
ECHO RIGHT VENTRICULAR SYSTOLIC PRESSURE (RVSP): 22 MMHG
ECHO RV BASAL DIMENSION: 3.1 CM
ECHO RV FREE WALL PEAK S': 13.4 CM/S
ECHO RV LONGITUDINAL DIMENSION: 6.4 CM
ECHO RV MID DIMENSION: 2.5 CM
ECHO RV TAPSE: 2.1 CM (ref 1.7–?)
ECHO TV REGURGITANT MAX VELOCITY: 2.2 M/S
ECHO TV REGURGITANT PEAK GRADIENT: 20 MMHG
EKG ATRIAL RATE: 77 BPM
EKG DIAGNOSIS: NORMAL
EKG P AXIS: 89 DEGREES
EKG P-R INTERVAL: 114 MS
EKG Q-T INTERVAL: 356 MS
EKG QRS DURATION: 82 MS
EKG QTC CALCULATION (BAZETT): 402 MS
EKG R AXIS: 65 DEGREES
EKG T AXIS: 64 DEGREES
EKG VENTRICULAR RATE: 77 BPM

## 2025-01-21 PROCEDURE — 93010 ELECTROCARDIOGRAM REPORT: CPT | Performed by: INTERNAL MEDICINE

## 2025-01-21 PROCEDURE — 93306 TTE W/DOPPLER COMPLETE: CPT

## 2025-01-21 PROCEDURE — 93306 TTE W/DOPPLER COMPLETE: CPT | Performed by: INTERNAL MEDICINE

## 2025-01-21 PROCEDURE — 93005 ELECTROCARDIOGRAM TRACING: CPT | Performed by: NURSE PRACTITIONER

## 2025-01-22 ENCOUNTER — HOSPITAL ENCOUNTER (OUTPATIENT)
Dept: GENERAL RADIOLOGY | Age: 65
Discharge: HOME | End: 2025-01-22
Payer: MEDICARE

## 2025-01-22 ENCOUNTER — OFFICE VISIT (OUTPATIENT)
Dept: INTERNAL MEDICINE CLINIC | Age: 65
End: 2025-01-22

## 2025-01-22 ENCOUNTER — HOSPITAL ENCOUNTER (OUTPATIENT)
Dept: MAMMOGRAPHY | Age: 65
Discharge: HOME | End: 2025-01-22
Payer: MEDICARE

## 2025-01-22 ENCOUNTER — HOSPITAL ENCOUNTER (OUTPATIENT)
Age: 65
Discharge: HOME | End: 2025-01-22
Payer: MEDICARE

## 2025-01-22 VITALS — BODY MASS INDEX: 26.81 KG/M2 | WEIGHT: 142 LBS | HEIGHT: 61 IN

## 2025-01-22 VITALS
HEIGHT: 65 IN | WEIGHT: 142 LBS | RESPIRATION RATE: 18 BRPM | HEART RATE: 65 BPM | SYSTOLIC BLOOD PRESSURE: 138 MMHG | BODY MASS INDEX: 23.66 KG/M2 | DIASTOLIC BLOOD PRESSURE: 75 MMHG

## 2025-01-22 DIAGNOSIS — M81.0 AGE RELATED OSTEOPOROSIS, UNSPECIFIED PATHOLOGICAL FRACTURE PRESENCE: ICD-10-CM

## 2025-01-22 DIAGNOSIS — Z12.31 BREAST CANCER SCREENING BY MAMMOGRAM: ICD-10-CM

## 2025-01-22 DIAGNOSIS — G35 MULTIPLE SCLEROSIS (HCC): ICD-10-CM

## 2025-01-22 DIAGNOSIS — G89.29 CHRONIC RIGHT SHOULDER PAIN: ICD-10-CM

## 2025-01-22 DIAGNOSIS — M25.511 CHRONIC RIGHT SHOULDER PAIN: ICD-10-CM

## 2025-01-22 DIAGNOSIS — M51.369 DEGENERATION OF INTERVERTEBRAL DISC OF LUMBAR REGION, UNSPECIFIED WHETHER PAIN PRESENT: ICD-10-CM

## 2025-01-22 DIAGNOSIS — K21.9 GASTROESOPHAGEAL REFLUX DISEASE WITHOUT ESOPHAGITIS: ICD-10-CM

## 2025-01-22 DIAGNOSIS — Z00.00 INITIAL MEDICARE ANNUAL WELLNESS VISIT: Primary | ICD-10-CM

## 2025-01-22 DIAGNOSIS — F33.2 SEVERE EPISODE OF RECURRENT MAJOR DEPRESSIVE DISORDER, WITHOUT PSYCHOTIC FEATURES (HCC): ICD-10-CM

## 2025-01-22 DIAGNOSIS — I10 BENIGN ESSENTIAL HTN: ICD-10-CM

## 2025-01-22 DIAGNOSIS — Z00.00 MEDICARE ANNUAL WELLNESS VISIT, SUBSEQUENT: ICD-10-CM

## 2025-01-22 PROCEDURE — 77063 BREAST TOMOSYNTHESIS BI: CPT

## 2025-01-22 PROCEDURE — 3075F SYST BP GE 130 - 139MM HG: CPT | Performed by: INTERNAL MEDICINE

## 2025-01-22 PROCEDURE — 73030 X-RAY EXAM OF SHOULDER: CPT

## 2025-01-22 PROCEDURE — 3078F DIAST BP <80 MM HG: CPT | Performed by: INTERNAL MEDICINE

## 2025-01-22 PROCEDURE — G0438 PPPS, INITIAL VISIT: HCPCS | Performed by: INTERNAL MEDICINE

## 2025-01-22 RX ORDER — BUPROPION HYDROCHLORIDE 300 MG/1
300 TABLET ORAL DAILY
Qty: 90 TABLET | Refills: 0
Start: 2025-01-22

## 2025-01-22 RX ORDER — GABAPENTIN 400 MG/1
400 CAPSULE ORAL 3 TIMES DAILY
Qty: 270 CAPSULE | Refills: 0
Start: 2025-01-22 | End: 2025-04-22

## 2025-01-22 RX ORDER — BUPROPION HYDROCHLORIDE 150 MG/1
150 TABLET ORAL EVERY MORNING
Qty: 90 TABLET | Refills: 1
Start: 2025-01-22

## 2025-01-22 ASSESSMENT — PATIENT HEALTH QUESTIONNAIRE - PHQ9
SUM OF ALL RESPONSES TO PHQ QUESTIONS 1-9: 5
9. THOUGHTS THAT YOU WOULD BE BETTER OFF DEAD, OR OF HURTING YOURSELF: NOT AT ALL
SUM OF ALL RESPONSES TO PHQ QUESTIONS 1-9: 5
SUM OF ALL RESPONSES TO PHQ QUESTIONS 1-9: 0
SUM OF ALL RESPONSES TO PHQ QUESTIONS 1-9: 5
8. MOVING OR SPEAKING SO SLOWLY THAT OTHER PEOPLE COULD HAVE NOTICED. OR THE OPPOSITE, BEING SO FIGETY OR RESTLESS THAT YOU HAVE BEEN MOVING AROUND A LOT MORE THAN USUAL: MORE THAN HALF THE DAYS
SUM OF ALL RESPONSES TO PHQ QUESTIONS 1-9: 0
SUM OF ALL RESPONSES TO PHQ9 QUESTIONS 1 & 2: 2
3. TROUBLE FALLING OR STAYING ASLEEP: NOT AT ALL
4. FEELING TIRED OR HAVING LITTLE ENERGY: SEVERAL DAYS
SUM OF ALL RESPONSES TO PHQ QUESTIONS 1-9: 0
SUM OF ALL RESPONSES TO PHQ QUESTIONS 1-9: 5
2. FEELING DOWN, DEPRESSED OR HOPELESS: SEVERAL DAYS
1. LITTLE INTEREST OR PLEASURE IN DOING THINGS: NOT AT ALL
SUM OF ALL RESPONSES TO PHQ9 QUESTIONS 1 & 2: 0
10. IF YOU CHECKED OFF ANY PROBLEMS, HOW DIFFICULT HAVE THESE PROBLEMS MADE IT FOR YOU TO DO YOUR WORK, TAKE CARE OF THINGS AT HOME, OR GET ALONG WITH OTHER PEOPLE: SOMEWHAT DIFFICULT
6. FEELING BAD ABOUT YOURSELF - OR THAT YOU ARE A FAILURE OR HAVE LET YOURSELF OR YOUR FAMILY DOWN: NOT AT ALL
5. POOR APPETITE OR OVEREATING: NOT AT ALL
SUM OF ALL RESPONSES TO PHQ QUESTIONS 1-9: 0
2. FEELING DOWN, DEPRESSED OR HOPELESS: NOT AT ALL
1. LITTLE INTEREST OR PLEASURE IN DOING THINGS: SEVERAL DAYS
7. TROUBLE CONCENTRATING ON THINGS, SUCH AS READING THE NEWSPAPER OR WATCHING TELEVISION: NOT AT ALL

## 2025-01-22 ASSESSMENT — COLUMBIA-SUICIDE SEVERITY RATING SCALE - C-SSRS
2. HAVE YOU ACTUALLY HAD ANY THOUGHTS OF KILLING YOURSELF?: NO
6. HAVE YOU EVER DONE ANYTHING, STARTED TO DO ANYTHING, OR PREPARED TO DO ANYTHING TO END YOUR LIFE?: NO
1. WITHIN THE PAST MONTH, HAVE YOU WISHED YOU WERE DEAD OR WISHED YOU COULD GO TO SLEEP AND NOT WAKE UP?: NO

## 2025-01-22 ASSESSMENT — LIFESTYLE VARIABLES
HOW OFTEN DO YOU HAVE A DRINK CONTAINING ALCOHOL: NEVER
HOW MANY STANDARD DRINKS CONTAINING ALCOHOL DO YOU HAVE ON A TYPICAL DAY: PATIENT DOES NOT DRINK

## 2025-01-22 NOTE — PATIENT INSTRUCTIONS
If you have questions about a medical condition or this instruction, always ask your healthcare professional. [employer] disclaims any warranty or liability for your use of this information.    Personalized Preventive Plan for Hellen Newell - 1/22/2025  Medicare offers a range of preventive health benefits. Some of the tests and screenings are paid in full while other may be subject to a deductible, co-insurance, and/or copay.  Some of these benefits include a comprehensive review of your medical history including lifestyle, illnesses that may run in your family, and various assessments and screenings as appropriate.  After reviewing your medical record and screening and assessments performed today your provider may have ordered immunizations, labs, imaging, and/or referrals for you.  A list of these orders (if applicable) as well as your Preventive Care list are included within your After Visit Summary for your review.

## 2025-01-22 NOTE — PROGRESS NOTES
Medicare Annual Wellness Visit    Hellen Newell is here for Medicare AWV    Assessment & Plan   Age related osteoporosis, unspecified pathological fracture presence  Multiple sclerosis (HCC)  Degeneration of intervertebral disc of lumbar region, unspecified whether pain present  Severe episode of recurrent major depressive disorder, without psychotic features (HCC)  Benign essential HTN  Gastroesophageal reflux disease without esophagitis  Medicare annual wellness visit, subsequent  Breast cancer screening by mammogram  -     YUMIKO DIGITAL SCREEN W OR WO CAD BILATERAL; Future       Return in about 6 months (around 7/22/2025) for htn .     Subjective       64 y.o. female with h.o HTN,, MS on copaxone,   varicose veins, chronic pedal edema here for medicare wellness visit     Has h.o MS with mild cognitive impairment  -   f/w Dr. Jia Benavides at [facility] . was on copaxone with good response but recently off this med for unclear reasons   Pt wishes not to take this med any more   No recent flare up  Remote h.o transverse myelitis in cervical region       Worsening kyphosis   Since last time pt had continued couples of falls with no injuries   Multiple falls with kyphosis and unstable gait over last few yrs , see below    8/21 -  a fall on right hip leading to fracture, admitted to [facility] and had right she arthroplasty done    Again had a fall in lawn  11/21  Sustaining right tibial plateau fracture and now has knee brace, using crutches for ambulation    Again fell in 6/23 after dog pulled her , landed on right shoulder sustained proximal humerus fracture , tx conservatively       ongoing   low back pain , scoliosis, continues to be  in pain all the time. Seen spine surgery at [place] and planning for   thoraco lumbar fusion to avoid kyphosis and falls  , after osteoporosis tx     Hx of osteoporosis , now on EVENITY monthly injections x 1 yr   F.w Dr. Lobo   Now using cane or walker to avoid falls    Using

## 2025-01-24 ENCOUNTER — TELEPHONE (OUTPATIENT)
Dept: INTERNAL MEDICINE CLINIC | Age: 65
End: 2025-01-24

## 2025-01-24 RX ORDER — AZITHROMYCIN 250 MG/1
TABLET, FILM COATED ORAL
Qty: 6 TABLET | Refills: 0 | Status: SHIPPED | OUTPATIENT
Start: 2025-01-24

## 2025-01-24 NOTE — TELEPHONE ENCOUNTER
----- Message from Lane RAMOS sent at 1/24/2025  3:53 PM EST -----  Contact: ZOILA [phone]  Patient states she has again tested positive for Covid today. Pt experiencing cough, runny nose, shortness of breath and some mild chest soreness. Pt last tested positive around 1/7/2024. Pt requesting a script for Paxlovid be sent to the below pharmacy. Please advise      [facility] [address] [place] [address] SUSIE RIVERA - P [phone] - F [phone]  [address] [facility] [address]  Phone: [phone]  Fax: [phone]

## 2025-01-27 RX ORDER — OMEPRAZOLE 20 MG/1
20 CAPSULE, DELAYED RELEASE ORAL DAILY
Qty: 30 CAPSULE | Refills: 0 | OUTPATIENT
Start: 2025-01-27

## 2025-01-28 RX ORDER — MONTELUKAST SODIUM 10 MG/1
10 TABLET ORAL NIGHTLY
Qty: 30 TABLET | Refills: 5 | Status: SHIPPED | OUTPATIENT
Start: 2025-01-28

## 2025-02-03 ENCOUNTER — APPOINTMENT (OUTPATIENT)
Dept: GENERAL RADIOLOGY | Age: 65
End: 2025-02-03
Payer: MEDICARE

## 2025-02-03 ENCOUNTER — HOSPITAL ENCOUNTER (EMERGENCY)
Age: 65
Discharge: HOME | End: 2025-02-03
Attending: STUDENT IN AN ORGANIZED HEALTH CARE EDUCATION/TRAINING PROGRAM
Payer: MEDICARE

## 2025-02-03 ENCOUNTER — OFFICE VISIT (OUTPATIENT)
Dept: INTERNAL MEDICINE CLINIC | Age: 65
End: 2025-02-03

## 2025-02-03 ENCOUNTER — APPOINTMENT (OUTPATIENT)
Dept: CT IMAGING | Age: 65
End: 2025-02-03
Payer: MEDICARE

## 2025-02-03 VITALS
DIASTOLIC BLOOD PRESSURE: 84 MMHG | WEIGHT: 138 LBS | RESPIRATION RATE: 20 BRPM | HEART RATE: 113 BPM | SYSTOLIC BLOOD PRESSURE: 130 MMHG | HEIGHT: 61 IN | BODY MASS INDEX: 26.06 KG/M2

## 2025-02-03 VITALS
DIASTOLIC BLOOD PRESSURE: 99 MMHG | SYSTOLIC BLOOD PRESSURE: 136 MMHG | HEART RATE: 78 BPM | TEMPERATURE: 97.9 F | RESPIRATION RATE: 16 BRPM | OXYGEN SATURATION: 97 %

## 2025-02-03 DIAGNOSIS — G35 MULTIPLE SCLEROSIS (HCC): ICD-10-CM

## 2025-02-03 DIAGNOSIS — I10 BENIGN ESSENTIAL HTN: ICD-10-CM

## 2025-02-03 DIAGNOSIS — Z01.818 PRE-OP EXAM: Primary | ICD-10-CM

## 2025-02-03 DIAGNOSIS — M81.0 AGE RELATED OSTEOPOROSIS, UNSPECIFIED PATHOLOGICAL FRACTURE PRESENCE: ICD-10-CM

## 2025-02-03 DIAGNOSIS — R55 NEAR SYNCOPE: Primary | ICD-10-CM

## 2025-02-03 DIAGNOSIS — M51.369 DEGENERATION OF INTERVERTEBRAL DISC OF LUMBAR REGION, UNSPECIFIED WHETHER PAIN PRESENT: ICD-10-CM

## 2025-02-03 DIAGNOSIS — F33.2 SEVERE EPISODE OF RECURRENT MAJOR DEPRESSIVE DISORDER, WITHOUT PSYCHOTIC FEATURES (HCC): ICD-10-CM

## 2025-02-03 DIAGNOSIS — K21.9 GASTROESOPHAGEAL REFLUX DISEASE WITHOUT ESOPHAGITIS: ICD-10-CM

## 2025-02-03 DIAGNOSIS — G62.9 PERIPHERAL POLYNEUROPATHY: ICD-10-CM

## 2025-02-03 LAB
ALBUMIN SERPL-MCNC: 4.2 G/DL (ref 3.4–5)
ALBUMIN/GLOB SERPL: 1.8 {RATIO} (ref 1.1–2.2)
ALP SERPL-CCNC: 99 U/L (ref 40–129)
ALT SERPL-CCNC: 22 U/L (ref 10–40)
AMPHETAMINES UR QL SCN>1000 NG/ML: NORMAL
ANION GAP SERPL CALCULATED.3IONS-SCNC: 8 MMOL/L (ref 3–16)
AST SERPL-CCNC: 23 U/L (ref 15–37)
BARBITURATES UR QL SCN>200 NG/ML: NORMAL
BASE EXCESS BLDV CALC-SCNC: 3.2 MMOL/L (ref -3–3)
BASOPHILS # BLD: 0 K/UL (ref 0–0.2)
BASOPHILS NFR BLD: 0.5 %
BENZODIAZ UR QL SCN>200 NG/ML: NORMAL
BILIRUB SERPL-MCNC: 0.3 MG/DL (ref 0–1)
BILIRUB UR QL STRIP.AUTO: NEGATIVE
BUN SERPL-MCNC: 14 MG/DL (ref 7–20)
CALCIUM SERPL-MCNC: 8.7 MG/DL (ref 8.3–10.6)
CANNABINOIDS UR QL SCN>50 NG/ML: NORMAL
CHLORIDE SERPL-SCNC: 103 MMOL/L (ref 99–110)
CLARITY UR: CLEAR
CO2 BLDV-SCNC: 31 MMOL/L
CO2 SERPL-SCNC: 30 MMOL/L (ref 21–32)
COCAINE UR QL SCN: NORMAL
COHGB MFR BLDV: 1.1 % (ref 0–1.5)
COLOR UR: YELLOW
CREAT SERPL-MCNC: 0.8 MG/DL (ref 0.6–1.2)
DEPRECATED RDW RBC AUTO: 12.6 % (ref 12.4–15.4)
DRUG SCREEN COMMENT UR-IMP: NORMAL
EOSINOPHIL # BLD: 0.2 K/UL (ref 0–0.6)
EOSINOPHIL NFR BLD: 3 %
ETHANOLAMINE SERPL-MCNC: NORMAL MG/DL (ref 0–0.08)
FENTANYL SCREEN, URINE: NORMAL
FLUAV RNA RESP QL NAA+PROBE: NOT DETECTED
FLUBV RNA RESP QL NAA+PROBE: NOT DETECTED
GFR SERPLBLD CREATININE-BSD FMLA CKD-EPI: 83 ML/MIN/{1.73_M2}
GLUCOSE SERPL-MCNC: 95 MG/DL (ref 70–99)
GLUCOSE UR STRIP.AUTO-MCNC: NEGATIVE MG/DL
HCO3 BLDV-SCNC: 29.1 MMOL/L (ref 23–29)
HCT VFR BLD AUTO: 39.2 % (ref 36–48)
HGB BLD-MCNC: 13 G/DL (ref 12–16)
HGB UR QL STRIP.AUTO: NEGATIVE
KETONES UR STRIP.AUTO-MCNC: NEGATIVE MG/DL
LACTATE BLDV-SCNC: 0.9 MMOL/L (ref 0.4–2)
LEUKOCYTE ESTERASE UR QL STRIP.AUTO: NEGATIVE
LYMPHOCYTES # BLD: 1.7 K/UL (ref 1–5.1)
LYMPHOCYTES NFR BLD: 29.5 %
MAGNESIUM SERPL-MCNC: 1.93 MG/DL (ref 1.8–2.4)
MCH RBC QN AUTO: 31.2 PG (ref 26–34)
MCHC RBC AUTO-ENTMCNC: 33.2 G/DL (ref 31–36)
MCV RBC AUTO: 93.9 FL (ref 80–100)
METHADONE UR QL SCN>300 NG/ML: NORMAL
METHGB MFR BLDV: 0.3 %
MONOCYTES # BLD: 0.5 K/UL (ref 0–1.3)
MONOCYTES NFR BLD: 7.8 %
NEUTROPHILS # BLD: 3.4 K/UL (ref 1.7–7.7)
NEUTROPHILS NFR BLD: 59.2 %
NITRITE UR QL STRIP.AUTO: NEGATIVE
NT-PROBNP SERPL-MCNC: 158 PG/ML (ref 0–124)
O2 THERAPY: ABNORMAL
OPIATES UR QL SCN>300 NG/ML: NORMAL
OXYCODONE UR QL SCN: NORMAL
PCO2 BLDV: 49.7 MMHG (ref 40–50)
PCP UR QL SCN>25 NG/ML: NORMAL
PH BLDV: 7.39 [PH] (ref 7.35–7.45)
PH UR STRIP.AUTO: 6.5 [PH] (ref 5–8)
PH UR STRIP: 6.5 [PH]
PLATELET # BLD AUTO: 252 K/UL (ref 135–450)
PMV BLD AUTO: 8.5 FL (ref 5–10.5)
PO2 BLDV: 31.1 MMHG (ref 25–40)
POTASSIUM SERPL-SCNC: 3.4 MMOL/L (ref 3.5–5.1)
PROT SERPL-MCNC: 6.6 G/DL (ref 6.4–8.2)
PROT UR STRIP.AUTO-MCNC: NEGATIVE MG/DL
RBC # BLD AUTO: 4.17 M/UL (ref 4–5.2)
SAO2 % BLDV: 58 %
SARS-COV-2 RNA RESP QL NAA+PROBE: NOT DETECTED
SODIUM SERPL-SCNC: 141 MMOL/L (ref 136–145)
SP GR UR STRIP.AUTO: 1.01 (ref 1–1.03)
TROPONIN, HIGH SENSITIVITY: 15 NG/L (ref 0–14)
TROPONIN, HIGH SENSITIVITY: 17 NG/L (ref 0–14)
UA COMPLETE W REFLEX CULTURE PNL UR: NORMAL
UA DIPSTICK W REFLEX MICRO PNL UR: NORMAL
URN SPEC COLLECT METH UR: NORMAL
UROBILINOGEN UR STRIP-ACNC: 0.2 E.U./DL
WBC # BLD AUTO: 5.8 K/UL (ref 4–11)

## 2025-02-03 PROCEDURE — 6370000000 HC RX 637 (ALT 250 FOR IP): Performed by: STUDENT IN AN ORGANIZED HEALTH CARE EDUCATION/TRAINING PROGRAM

## 2025-02-03 PROCEDURE — 3075F SYST BP GE 130 - 139MM HG: CPT | Performed by: NURSE PRACTITIONER

## 2025-02-03 PROCEDURE — 81003 URINALYSIS AUTO W/O SCOPE: CPT

## 2025-02-03 PROCEDURE — 82803 BLOOD GASES ANY COMBINATION: CPT

## 2025-02-03 PROCEDURE — 80307 DRUG TEST PRSMV CHEM ANLYZR: CPT

## 2025-02-03 PROCEDURE — 82077 ASSAY SPEC XCP UR&BREATH IA: CPT

## 2025-02-03 PROCEDURE — 99285 EMERGENCY DEPT VISIT HI MDM: CPT

## 2025-02-03 PROCEDURE — 85025 COMPLETE CBC W/AUTO DIFF WBC: CPT

## 2025-02-03 PROCEDURE — 83880 ASSAY OF NATRIURETIC PEPTIDE: CPT

## 2025-02-03 PROCEDURE — 83735 ASSAY OF MAGNESIUM: CPT

## 2025-02-03 PROCEDURE — 99214 OFFICE O/P EST MOD 30 MIN: CPT | Performed by: NURSE PRACTITIONER

## 2025-02-03 PROCEDURE — 80053 COMPREHEN METABOLIC PANEL: CPT

## 2025-02-03 PROCEDURE — 93005 ELECTROCARDIOGRAM TRACING: CPT | Performed by: STUDENT IN AN ORGANIZED HEALTH CARE EDUCATION/TRAINING PROGRAM

## 2025-02-03 PROCEDURE — 83605 ASSAY OF LACTIC ACID: CPT

## 2025-02-03 PROCEDURE — 87636 SARSCOV2 & INF A&B AMP PRB: CPT

## 2025-02-03 PROCEDURE — 71045 X-RAY EXAM CHEST 1 VIEW: CPT

## 2025-02-03 PROCEDURE — 84484 ASSAY OF TROPONIN QUANT: CPT

## 2025-02-03 PROCEDURE — 70450 CT HEAD/BRAIN W/O DYE: CPT

## 2025-02-03 PROCEDURE — 3079F DIAST BP 80-89 MM HG: CPT | Performed by: NURSE PRACTITIONER

## 2025-02-03 RX ORDER — LANOLIN ALCOHOL/MO/W.PET/CERES
400 CREAM (GRAM) TOPICAL ONCE
Status: COMPLETED | OUTPATIENT
Start: 2025-02-03 | End: 2025-02-03

## 2025-02-03 RX ORDER — SUMATRIPTAN SUCCINATE 25 MG/1
50 TABLET ORAL ONCE
Status: COMPLETED | OUTPATIENT
Start: 2025-02-03 | End: 2025-02-03

## 2025-02-03 RX ADMIN — SUMATRIPTAN SUCCINATE 50 MG: 25 TABLET ORAL at 21:05

## 2025-02-03 RX ADMIN — POTASSIUM BICARBONATE 40 MEQ: 782 TABLET, EFFERVESCENT ORAL at 20:02

## 2025-02-03 RX ADMIN — Medication 400 MG: at 20:03

## 2025-02-03 ASSESSMENT — LIFESTYLE VARIABLES
HOW MANY STANDARD DRINKS CONTAINING ALCOHOL DO YOU HAVE ON A TYPICAL DAY: PATIENT DOES NOT DRINK
HOW OFTEN DO YOU HAVE A DRINK CONTAINING ALCOHOL: NEVER

## 2025-02-03 ASSESSMENT — PAIN - FUNCTIONAL ASSESSMENT
PAIN_FUNCTIONAL_ASSESSMENT: NONE - DENIES PAIN
PAIN_FUNCTIONAL_ASSESSMENT: NONE - DENIES PAIN

## 2025-02-03 NOTE — PROGRESS NOTES
Subjective:      Hellen Newell is a 64 y.o. female who presents to the office today for a preoperative consultation at the request of surgeon Dr. Crawley who plans on performing LUMBAR 4-SACRAL 1 ANTERIOR LUMBAR INTERBODY FUSION, LUMBAR 2-LUMBAR 4 LATERAL LUMBAR INTERBODY FUSION, THORACIC 8 - PELVIS FUSION, FIXATION, LUMBAR 2- LUMBAR 4 PEDICLE SUB TRACTION OSTEOTOMY, CEMENT AUGMENTATION.   Planned anesthesia is General and MAC.       Past Medical History:   Diagnosis Date    Age related osteoporosis 2021    MS (congenital mitral stenosis)     MS (multiple sclerosis) ([facility])      Patient Active Problem List    Diagnosis Date Noted    Age related osteoporosis 2021    Gastroesophageal reflux disease without esophagitis 2020    Central stenosis of spinal canal 2019    DDD (degenerative disc disease), lumbar 2019    T12 compression fracture ([facility]) 2019    Severe episode of recurrent major depressive disorder, without psychotic features ([facility]) 2019    Reactive depression 10/01/2018    Closed fracture of pubic ramus ([facility]) 2018    Menopausal state 10/12/2017    Cervical radiculopathy 2017    Adhesive capsulitis of left shoulder 2017    Shoulder impingement 2017    Left shoulder pain 2017    Chronic left shoulder pain 2017    Peripheral polyneuropathy 2016    Benign essential HTN 2016    Multiple sclerosis ([facility]) 2016     Past Surgical History:   Procedure Laterality Date     SECTION      CHOLECYSTECTOMY       Family History   Problem Relation Age of Onset    Breast Cancer Paternal Aunt     Ovarian Cancer Maternal Aunt      Social History     Socioeconomic History    Marital status:    Tobacco Use    Smoking status: Never    Smokeless tobacco: Never   Substance and Sexual Activity    Alcohol use: No    Drug use: No     Social Determinants of Health      [facility], [facility]

## 2025-02-04 LAB
EKG ATRIAL RATE: 77 BPM
EKG ATRIAL RATE: 84 BPM
EKG DIAGNOSIS: NORMAL
EKG DIAGNOSIS: NORMAL
EKG P AXIS: 102 DEGREES
EKG P AXIS: 72 DEGREES
EKG P-R INTERVAL: 120 MS
EKG P-R INTERVAL: 120 MS
EKG Q-T INTERVAL: 376 MS
EKG Q-T INTERVAL: 394 MS
EKG QRS DURATION: 72 MS
EKG QRS DURATION: 82 MS
EKG QTC CALCULATION (BAZETT): 444 MS
EKG QTC CALCULATION (BAZETT): 445 MS
EKG R AXIS: 25 DEGREES
EKG R AXIS: 57 DEGREES
EKG T AXIS: 54 DEGREES
EKG T AXIS: 60 DEGREES
EKG VENTRICULAR RATE: 77 BPM
EKG VENTRICULAR RATE: 84 BPM

## 2025-02-04 PROCEDURE — 93010 ELECTROCARDIOGRAM REPORT: CPT | Performed by: INTERNAL MEDICINE

## 2025-02-04 NOTE — ED PROVIDER NOTES
[facility] EMERGENCY DEPARTMENT     EMERGENCY DEPARTMENT ENCOUNTER         Pt Name: Hellen Newell   MRN: [id number]   Birthdate 1960   Date of evaluation: 2/3/2025   Provider: Kamran Hanna MD   PCP: Joselito Thompson MD   Note Started: 1:12 AM EST 25       Chief Complaint     Dizziness (Reports syncopal episode at doctors office this afternoon 1530. )      History of Present Illness     Hellen Newell is a 64 y.o. female who presents with episode of dizziness or presyncope while visiting her doctor's office today.  Patient states that she has a history of similar syndromes where she has some altered awareness though can still hear voices.  She wonders if this is not related to her multiple sclerosis.  She is not currently on therapies for multiple sclerosis and when I questioned her regarding her recent flare or initial diagnosis she cannot describe further.  The patient is a poor historian.  In discussion with patient and ultimately with family member who called who was with the patient at the doctor's office the patient was visiting the doctor's offices for clearance for surgery seem to have some mild confusion or even a near syncope and describes dizziness and therefore was referred down to the emergency department from the doctor's clinic.  There was no overt syncope there was no report of seizure.  There may have been a transient episode of low oxygen numbers on the pulse oximetry though this resolved spontaneously on arrival here the patient states she feels fatigued but otherwise has no acute complaints.      Review of Systems     Positives and pertinent negatives as per HPI.    Past Medical, Surgical, Family, and Social History     She has a past medical history of Age related osteoporosis, MS (congenital mitral stenosis), and MS (multiple sclerosis) ([facility]).  She has a past surgical history that includes Cholecystectomy and  section.  Her family history includes Breast

## 2025-02-04 NOTE — DISCHARGE INSTRUCTIONS
You were evaluated in the emergency department for near syncope. Assessments and testing completed during your visit were reassuring and at this time there is no indication for further testing, treatment or admission to the hospital. Given this it is appropriate to discharge you from the emergency department. At the time of discharge we discussed the following:    Please follow up to your primary doctor for further recommendations and return with new or worsening symptoms as we discussed     Please note that sometimes it is difficult to diagnose a medical condition early in the disease process before the disease is fully manifest. Because of this, should you develop any new or worsening symptoms, you may return at any time to the emergency department for another evaluation. If available you are also recommended to review this visit with your primary care physician or other medical provider in the next 7 days. Thank you for allowing us to care for you today.

## 2025-02-07 ENCOUNTER — TELEPHONE (OUTPATIENT)
Dept: INTERNAL MEDICINE CLINIC | Age: 65
End: 2025-02-07

## 2025-02-07 ENCOUNTER — OFFICE VISIT (OUTPATIENT)
Dept: INTERNAL MEDICINE CLINIC | Age: 65
End: 2025-02-07

## 2025-02-07 VITALS
HEART RATE: 70 BPM | SYSTOLIC BLOOD PRESSURE: 140 MMHG | WEIGHT: 140 LBS | DIASTOLIC BLOOD PRESSURE: 80 MMHG | HEIGHT: 61 IN | RESPIRATION RATE: 14 BRPM | BODY MASS INDEX: 26.43 KG/M2

## 2025-02-07 DIAGNOSIS — M25.511 RIGHT SHOULDER PAIN, UNSPECIFIED CHRONICITY: Primary | ICD-10-CM

## 2025-02-07 NOTE — TELEPHONE ENCOUNTER
Per Dr. Thompson-call Dr. Crawley office to postpone patients surgery due to needing a mental health evaluation.     Voicemail was left on confidential voicemail for Dr. Crawley /assistant letting them know to postpone the surgery.

## 2025-02-07 NOTE — PROGRESS NOTES
Conjunctiva/sclera: Conjunctivae normal.      Pupils: Pupils are equal, round, and reactive to light.   Neck:      Thyroid: No thyromegaly.      Trachea: No tracheal deviation.   Cardiovascular:      Rate and Rhythm: Normal rate and regular rhythm.      Heart sounds: Normal heart sounds. No murmur heard.     No friction rub. No gallop.   Pulmonary:      Effort: Pulmonary effort is normal. No respiratory distress.      Breath sounds: Normal breath sounds. No wheezing or rales.   Abdominal:      General: Bowel sounds are normal. There is no distension.      Palpations: Abdomen is soft.      Tenderness: There is no abdominal tenderness.   Musculoskeletal:      Left shoulder: Decreased range of motion.      Thoracic back: Tenderness present.      Lumbar back: Tenderness present.   Lymphadenopathy:      Cervical: No cervical adenopathy.   Skin:     General: Skin is warm and dry.   Neurological:      Mental Status: She is alert and oriented to person, place, and time.   Psychiatric:         Mood and Affect: Mood is anxious. Affect is tearful.           Assessment and Plan     Right shoulder pain, unspecified chronicity  - Non BS - External Referral To Physical Therapy    Depression, anxiety  -her anxiety and depression is worse as of late.  -she is very stressed and anxious about upcoming procedure.  She does not want to undergo procedure at this time. At this time, mentally, I do not think it is best for patient to undergo procedure. She fears what the outcomes from the procedure would be and does not want to diminish her quality of life.   -continue Wellbutrin.   -suggested seeking counseling.  She would like to do this.   -please notify provider if this worsens.     Return if symptoms worsen or fail to improve.    Bridget GARCIAP-C

## 2025-02-11 ENCOUNTER — TELEPHONE (OUTPATIENT)
Dept: CASE MANAGEMENT | Age: 65
End: 2025-02-11

## 2025-02-11 NOTE — TELEPHONE ENCOUNTER
Chest XR 2/3/25    9 mm nodular opacity overlying the right lung base. Recommend further  evaluation with a nonemergent CT chest.    Adelita MANCIA(R)  Patient Navigator  Incidentals/Lung Navigation  [email or web address]

## 2025-02-17 RX ORDER — TRIAMTERENE AND HYDROCHLOROTHIAZIDE 37.5; 25 MG/1; MG/1
1 TABLET ORAL DAILY
Qty: 90 TABLET | Refills: 0 | Status: SHIPPED | OUTPATIENT
Start: 2025-02-17

## 2025-02-25 RX ORDER — OMEPRAZOLE 20 MG/1
20 CAPSULE, DELAYED RELEASE ORAL DAILY
Qty: 90 CAPSULE | Refills: 1 | Status: SHIPPED | OUTPATIENT
Start: 2025-02-25

## 2025-02-25 RX ORDER — LOSARTAN POTASSIUM 50 MG/1
50 TABLET ORAL DAILY
Qty: 90 TABLET | Refills: 1 | Status: SHIPPED | OUTPATIENT
Start: 2025-02-25

## 2025-04-23 RX ORDER — POTASSIUM CHLORIDE 750 MG/1
10 TABLET, EXTENDED RELEASE ORAL 2 TIMES DAILY
Qty: 180 TABLET | Refills: 0 | Status: SHIPPED | OUTPATIENT
Start: 2025-04-23

## 2025-05-22 RX ORDER — TRIAMTERENE AND HYDROCHLOROTHIAZIDE 37.5; 25 MG/1; MG/1
1 TABLET ORAL DAILY
Qty: 90 TABLET | Refills: 0 | Status: SHIPPED | OUTPATIENT
Start: 2025-05-22

## 2025-06-16 DIAGNOSIS — R91.1 PULMONARY NODULE: Primary | ICD-10-CM

## 2025-06-23 RX ORDER — EPINEPHRINE 0.3 MG/.3ML
INJECTION SUBCUTANEOUS
Qty: 2 EACH | Refills: 0 | Status: SHIPPED | OUTPATIENT
Start: 2025-06-23

## 2025-07-23 RX ORDER — POTASSIUM CHLORIDE 750 MG/1
10 TABLET, EXTENDED RELEASE ORAL 2 TIMES DAILY
Qty: 180 TABLET | Refills: 0 | Status: SHIPPED | OUTPATIENT
Start: 2025-07-23

## 2025-07-25 ENCOUNTER — OFFICE VISIT (OUTPATIENT)
Dept: INTERNAL MEDICINE CLINIC | Age: 65
End: 2025-07-25

## 2025-07-25 VITALS
HEIGHT: 61 IN | RESPIRATION RATE: 18 BRPM | DIASTOLIC BLOOD PRESSURE: 75 MMHG | WEIGHT: 139 LBS | BODY MASS INDEX: 26.24 KG/M2 | HEART RATE: 65 BPM | SYSTOLIC BLOOD PRESSURE: 138 MMHG

## 2025-07-25 DIAGNOSIS — K21.9 GASTROESOPHAGEAL REFLUX DISEASE WITHOUT ESOPHAGITIS: ICD-10-CM

## 2025-07-25 DIAGNOSIS — I10 BENIGN ESSENTIAL HTN: Primary | ICD-10-CM

## 2025-07-25 DIAGNOSIS — M81.0 AGE RELATED OSTEOPOROSIS, UNSPECIFIED PATHOLOGICAL FRACTURE PRESENCE: ICD-10-CM

## 2025-07-25 DIAGNOSIS — M51.369 DEGENERATION OF INTERVERTEBRAL DISC OF LUMBAR REGION, UNSPECIFIED WHETHER PAIN PRESENT: ICD-10-CM

## 2025-07-25 DIAGNOSIS — G35 MULTIPLE SCLEROSIS (HCC): ICD-10-CM

## 2025-07-25 DIAGNOSIS — M40.15 OTHER SECONDARY KYPHOSIS, THORACOLUMBAR REGION: ICD-10-CM

## 2025-07-25 DIAGNOSIS — F33.2 SEVERE EPISODE OF RECURRENT MAJOR DEPRESSIVE DISORDER, WITHOUT PSYCHOTIC FEATURES (HCC): ICD-10-CM

## 2025-07-25 RX ORDER — MODAFINIL 200 MG/1
200 TABLET ORAL DAILY
COMMUNITY
Start: 2020-01-01

## 2025-07-25 NOTE — PROGRESS NOTES
Subjective:      Patient ID: Hellen Newell is a 65 y.o. female.    HPI      65 y.o. female with h.o HTN,, MS on copaxone,   varicose veins, chronic pedal edema here for regular f.w     Has h.o MS  - f/w Dr. Jia Benavides at [facility] . was on copaxone with good response but off this med for few yrs and currently not on any meds. Remains stable with no recent flare up     Since last time pt had couples of falls with no injuries   Multiple falls with kyphosis and unstable gait over last few yrs , see below    8/21 -  a fall on right hip leading to fracture, admitted to [facility] and had right she arthroplasty done    Again had a fall in lawn  11/21  Sustaining right tibial plateau fracture and now has knee brace, using crutches for ambulation    Again fell in 6/23 after dog pulled her , landed on right shoulder sustained proximal humerus fracture , tx conservatively       ongoing   low back pain , scoliosis/kyphosis , continues to be  in pain all the time. Seen spine surgery at [place] and planning for staged thoraco lumbar fusion to avoid kyphosis and falls  , after osteoporosis tx , however given significant risk pt deferred procedure from last time and now decided not to go with it . Understands she will be left with scoliosis and spine issues but also knows surgery will keep her in bed for a long time and is worried about complications    During pre op eval earlier this year, pt was very anxious about upcoming surgery and had an panic event leading to fall at MD office and visit to ER. Surgery team got concerns about her anxiety and we suggested mental health eval but did not like this recommendation and she wants me to remove this statement from EMR         osteoporosis , was on EVENITY monthly injections x 1 yr   F.w Dr. Lobo  now on Prolia   Now using cane prn  to avoid falls    Using vicodin /flexeril /gabapentin now given by her neurology        HTN - stable on meds- losartan , maxzide   Uses lasix only

## 2025-08-20 RX ORDER — TRIAMTERENE AND HYDROCHLOROTHIAZIDE 37.5; 25 MG/1; MG/1
1 TABLET ORAL DAILY
Qty: 90 TABLET | Refills: 1 | Status: SHIPPED | OUTPATIENT
Start: 2025-08-20

## 2025-08-20 RX ORDER — MONTELUKAST SODIUM 10 MG/1
10 TABLET ORAL NIGHTLY
Qty: 90 TABLET | Refills: 1 | Status: SHIPPED | OUTPATIENT
Start: 2025-08-20

## 2025-08-22 ENCOUNTER — TELEPHONE (OUTPATIENT)
Dept: INTERNAL MEDICINE CLINIC | Age: 65
End: 2025-08-22

## 2025-08-26 ENCOUNTER — TELEPHONE (OUTPATIENT)
Dept: INTERNAL MEDICINE CLINIC | Age: 65
End: 2025-08-26

## 2025-08-26 RX ORDER — CEPHALEXIN 500 MG/1
500 CAPSULE ORAL 2 TIMES DAILY
Qty: 60 CAPSULE | Refills: 0 | Status: SHIPPED | OUTPATIENT
Start: 2025-08-26